# Patient Record
Sex: FEMALE | Race: WHITE | NOT HISPANIC OR LATINO | Employment: UNEMPLOYED | URBAN - METROPOLITAN AREA
[De-identification: names, ages, dates, MRNs, and addresses within clinical notes are randomized per-mention and may not be internally consistent; named-entity substitution may affect disease eponyms.]

---

## 2023-03-24 PROBLEM — G43.909 MIGRAINES: Status: ACTIVE | Noted: 2023-03-24

## 2023-03-24 PROBLEM — K55.1 SUPERIOR MESENTERIC ARTERY SYNDROME (MULTI): Status: ACTIVE | Noted: 2023-03-24

## 2023-03-24 PROBLEM — R20.0 NUMBNESS AND TINGLING: Status: ACTIVE | Noted: 2023-03-24

## 2023-03-24 PROBLEM — N89.8 VAGINAL LESION: Status: ACTIVE | Noted: 2023-03-24

## 2023-03-24 PROBLEM — H61.20 CERUMEN IMPACTION: Status: ACTIVE | Noted: 2023-03-24
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
## 2023-03-24 PROBLEM — R11.2 NAUSEA AND VOMITING IN ADULT: Status: ACTIVE | Noted: 2023-03-24

## 2023-03-24 PROBLEM — M25.50 JOINT PAIN: Status: ACTIVE | Noted: 2023-03-24

## 2023-03-24 PROBLEM — T81.49XA WOUND INFECTION AFTER SURGERY: Status: ACTIVE | Noted: 2023-03-24

## 2023-03-24 PROBLEM — N94.89 ADNEXAL MASS: Status: ACTIVE | Noted: 2023-03-24

## 2023-03-24 PROBLEM — M25.512 LEFT SHOULDER PAIN: Status: ACTIVE | Noted: 2023-03-24

## 2023-03-24 PROBLEM — M79.7 FIBROMYALGIA: Status: ACTIVE | Noted: 2023-03-24

## 2023-03-24 PROBLEM — D72.819 LEUKOPENIA: Status: ACTIVE | Noted: 2023-03-24

## 2023-03-24 PROBLEM — G62.9 NEUROPATHY: Status: ACTIVE | Noted: 2023-03-24

## 2023-03-24 PROBLEM — R93.5 ABNORMAL CT OF THE ABDOMEN: Status: ACTIVE | Noted: 2023-03-24

## 2023-03-24 PROBLEM — N90.89 LABIAL IRRITATION: Status: ACTIVE | Noted: 2023-03-24

## 2023-03-24 PROBLEM — F32.A ANXIETY AND DEPRESSION: Status: ACTIVE | Noted: 2023-03-24

## 2023-03-24 PROBLEM — E53.9 VITAMIN B DEFICIENCY: Status: ACTIVE | Noted: 2023-03-24

## 2023-03-24 PROBLEM — N89.8 VAGINAL DISCHARGE: Status: ACTIVE | Noted: 2023-03-24

## 2023-03-24 PROBLEM — B96.89 BACTERIAL VAGINOSIS: Status: ACTIVE | Noted: 2023-03-24

## 2023-03-24 PROBLEM — R20.2 PARESTHESIA OF ARM: Status: ACTIVE | Noted: 2023-03-24

## 2023-03-24 PROBLEM — K21.9 ACID REFLUX: Status: ACTIVE | Noted: 2023-03-24

## 2023-03-24 PROBLEM — E55.9 VITAMIN D DEFICIENCY: Status: ACTIVE | Noted: 2023-03-24

## 2023-03-24 PROBLEM — R63.4 ABNORMAL WEIGHT LOSS: Status: ACTIVE | Noted: 2023-03-24

## 2023-03-24 PROBLEM — M54.6 THORACIC SPINE PAIN: Status: ACTIVE | Noted: 2023-03-24

## 2023-03-24 PROBLEM — R20.2 NUMBNESS AND TINGLING: Status: ACTIVE | Noted: 2023-03-24

## 2023-03-24 PROBLEM — H61.20 WAX IN EAR: Status: ACTIVE | Noted: 2023-03-24

## 2023-03-24 PROBLEM — G54.0 THORACIC OUTLET SYNDROME OF LEFT THORACIC OUTLET: Status: ACTIVE | Noted: 2023-03-24

## 2023-03-24 PROBLEM — K52.9 ILEITIS: Status: ACTIVE | Noted: 2023-03-24

## 2023-03-24 PROBLEM — J32.9 SINUSITIS: Status: ACTIVE | Noted: 2023-03-24

## 2023-03-24 PROBLEM — E53.8 VITAMIN B 12 DEFICIENCY: Status: ACTIVE | Noted: 2023-03-24

## 2023-03-24 PROBLEM — O92.79 CLOGGED DUCT, POSTPARTUM (HHS-HCC): Status: ACTIVE | Noted: 2023-03-24

## 2023-03-24 PROBLEM — F41.9 ANXIETY AND DEPRESSION: Status: ACTIVE | Noted: 2023-03-24

## 2023-03-24 PROBLEM — T78.40XA ALLERGIC REACTION: Status: ACTIVE | Noted: 2023-03-24

## 2023-03-24 PROBLEM — M53.82 WEAKNESS OF NECK: Status: ACTIVE | Noted: 2023-03-24

## 2023-03-24 PROBLEM — D27.0 DERMOID CYST OF RIGHT OVARY: Status: ACTIVE | Noted: 2023-03-24

## 2023-03-24 PROBLEM — R10.9 ABDOMINAL PAIN: Status: ACTIVE | Noted: 2023-03-24

## 2023-03-24 PROBLEM — E53.1 VITAMIN B6 DEFICIENCY: Status: ACTIVE | Noted: 2023-03-24

## 2023-03-24 PROBLEM — L98.9 PERINEAL IRRITATION IN FEMALE: Status: ACTIVE | Noted: 2023-03-24

## 2023-03-24 PROBLEM — R13.10 DYSPHAGIA: Status: ACTIVE | Noted: 2023-03-24

## 2023-03-24 PROBLEM — N76.0 VULVOVAGINITIS: Status: ACTIVE | Noted: 2023-03-24

## 2023-03-24 PROBLEM — N76.0 BACTERIAL VAGINOSIS: Status: ACTIVE | Noted: 2023-03-24

## 2023-03-24 PROBLEM — K50.00 TERMINAL ILEITIS WITHOUT COMPLICATION (MULTI): Status: ACTIVE | Noted: 2023-03-24

## 2023-03-24 RX ORDER — HYDROXYZINE HYDROCHLORIDE 10 MG/1
1 TABLET, FILM COATED ORAL 3 TIMES DAILY PRN
COMMUNITY

## 2023-03-24 RX ORDER — RIZATRIPTAN BENZOATE 10 MG/1
10 TABLET ORAL AS NEEDED
COMMUNITY
End: 2024-04-03 | Stop reason: SDUPTHER

## 2023-03-24 RX ORDER — FAMOTIDINE 20 MG/1
TABLET, FILM COATED ORAL
COMMUNITY
End: 2023-05-09 | Stop reason: SDUPTHER

## 2023-03-24 RX ORDER — PREGABALIN 25 MG/1
50 CAPSULE ORAL 3 TIMES DAILY
COMMUNITY
End: 2024-04-03 | Stop reason: ALTCHOICE

## 2023-03-24 RX ORDER — SUCRALFATE 1 G/10ML
10 SUSPENSION ORAL 4 TIMES DAILY
COMMUNITY

## 2023-03-24 RX ORDER — CITALOPRAM 20 MG/1
1 TABLET, FILM COATED ORAL DAILY
COMMUNITY
End: 2023-04-11 | Stop reason: SDUPTHER

## 2023-03-24 RX ORDER — METHOCARBAMOL 500 MG/1
TABLET, FILM COATED ORAL
COMMUNITY
End: 2024-04-03 | Stop reason: SDUPTHER

## 2023-03-24 RX ORDER — RABEPRAZOLE SODIUM 20 MG/1
2 TABLET, DELAYED RELEASE ORAL DAILY
COMMUNITY
End: 2023-05-09 | Stop reason: SDUPTHER

## 2023-03-29 ENCOUNTER — APPOINTMENT (OUTPATIENT)
Dept: PRIMARY CARE | Facility: CLINIC | Age: 33
End: 2023-03-29
Payer: COMMERCIAL

## 2023-04-11 ENCOUNTER — TELEPHONE (OUTPATIENT)
Dept: PRIMARY CARE | Facility: CLINIC | Age: 33
End: 2023-04-11
Payer: COMMERCIAL

## 2023-04-11 DIAGNOSIS — F32.A ANXIETY AND DEPRESSION: Primary | ICD-10-CM

## 2023-04-11 DIAGNOSIS — F41.9 ANXIETY AND DEPRESSION: Primary | ICD-10-CM

## 2023-04-11 RX ORDER — CITALOPRAM 20 MG/1
20 TABLET, FILM COATED ORAL DAILY
Qty: 30 TABLET | Refills: 1 | Status: SHIPPED | OUTPATIENT
Start: 2023-04-11 | End: 2023-05-09 | Stop reason: ALTCHOICE

## 2023-04-11 NOTE — TELEPHONE ENCOUNTER
She needs a refill on her Citopram 20 mg takes it 1 time a day and please send to [facility] in [place]

## 2023-04-25 ENCOUNTER — APPOINTMENT (OUTPATIENT)
Dept: PRIMARY CARE | Facility: CLINIC | Age: 33
End: 2023-04-25
Payer: COMMERCIAL

## 2023-05-09 ENCOUNTER — OFFICE VISIT (OUTPATIENT)
Dept: PRIMARY CARE | Facility: CLINIC | Age: 33
End: 2023-05-09
Payer: COMMERCIAL

## 2023-05-09 VITALS
BODY MASS INDEX: 17.4 KG/M2 | OXYGEN SATURATION: 98 % | HEIGHT: 60 IN | WEIGHT: 88.6 LBS | DIASTOLIC BLOOD PRESSURE: 74 MMHG | RESPIRATION RATE: 18 BRPM | HEART RATE: 88 BPM | SYSTOLIC BLOOD PRESSURE: 104 MMHG

## 2023-05-09 DIAGNOSIS — F32.A ANXIETY AND DEPRESSION: Primary | ICD-10-CM

## 2023-05-09 DIAGNOSIS — Z13.220 SCREENING FOR HYPERLIPIDEMIA: ICD-10-CM

## 2023-05-09 DIAGNOSIS — K21.9 GASTROESOPHAGEAL REFLUX DISEASE WITHOUT ESOPHAGITIS: ICD-10-CM

## 2023-05-09 DIAGNOSIS — F41.9 ANXIETY AND DEPRESSION: Primary | ICD-10-CM

## 2023-05-09 DIAGNOSIS — E53.8 VITAMIN B 12 DEFICIENCY: ICD-10-CM

## 2023-05-09 DIAGNOSIS — M79.7 FIBROMYALGIA: ICD-10-CM

## 2023-05-09 DIAGNOSIS — G54.0 THORACIC OUTLET SYNDROME OF LEFT THORACIC OUTLET: ICD-10-CM

## 2023-05-09 DIAGNOSIS — E55.9 VITAMIN D DEFICIENCY: ICD-10-CM

## 2023-05-09 DIAGNOSIS — R73.01 ELEVATED FASTING BLOOD SUGAR: ICD-10-CM

## 2023-05-09 PROBLEM — J32.9 SINUSITIS: Status: RESOLVED | Noted: 2023-03-24 | Resolved: 2023-05-09

## 2023-05-09 PROBLEM — M25.512 LEFT SHOULDER PAIN: Status: RESOLVED | Noted: 2023-03-24 | Resolved: 2023-05-09

## 2023-05-09 PROBLEM — B96.89 BACTERIAL VAGINOSIS: Status: RESOLVED | Noted: 2023-03-24 | Resolved: 2023-05-09

## 2023-05-09 PROBLEM — N89.8 VAGINAL LESION: Status: RESOLVED | Noted: 2023-03-24 | Resolved: 2023-05-09

## 2023-05-09 PROBLEM — N76.0 BACTERIAL VAGINOSIS: Status: RESOLVED | Noted: 2023-03-24 | Resolved: 2023-05-09

## 2023-05-09 PROBLEM — L98.9 PERINEAL IRRITATION IN FEMALE: Status: RESOLVED | Noted: 2023-03-24 | Resolved: 2023-05-09
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
## 2023-05-09 PROBLEM — K55.1 SUPERIOR MESENTERIC ARTERY SYNDROME (MULTI): Status: RESOLVED | Noted: 2023-03-24 | Resolved: 2023-05-09

## 2023-05-09 PROBLEM — N89.8 VAGINAL DISCHARGE: Status: RESOLVED | Noted: 2023-03-24 | Resolved: 2023-05-09

## 2023-05-09 PROBLEM — R13.10 DYSPHAGIA: Status: RESOLVED | Noted: 2023-03-24 | Resolved: 2023-05-09

## 2023-05-09 PROBLEM — R10.9 ABDOMINAL PAIN: Status: RESOLVED | Noted: 2023-03-24 | Resolved: 2023-05-09

## 2023-05-09 PROBLEM — N90.89 LABIAL IRRITATION: Status: RESOLVED | Noted: 2023-03-24 | Resolved: 2023-05-09

## 2023-05-09 PROBLEM — N94.89 ADNEXAL MASS: Status: RESOLVED | Noted: 2023-03-24 | Resolved: 2023-05-09

## 2023-05-09 PROBLEM — T81.49XA WOUND INFECTION AFTER SURGERY: Status: RESOLVED | Noted: 2023-03-24 | Resolved: 2023-05-09

## 2023-05-09 PROBLEM — R63.4 ABNORMAL WEIGHT LOSS: Status: RESOLVED | Noted: 2023-03-24 | Resolved: 2023-05-09

## 2023-05-09 PROBLEM — R20.2 NUMBNESS AND TINGLING: Status: RESOLVED | Noted: 2023-03-24 | Resolved: 2023-05-09

## 2023-05-09 PROBLEM — R11.2 NAUSEA AND VOMITING IN ADULT: Status: RESOLVED | Noted: 2023-03-24 | Resolved: 2023-05-09

## 2023-05-09 PROBLEM — H61.20 CERUMEN IMPACTION: Status: RESOLVED | Noted: 2023-03-24 | Resolved: 2023-05-09

## 2023-05-09 PROBLEM — R20.2 PARESTHESIA OF ARM: Status: RESOLVED | Noted: 2023-03-24 | Resolved: 2023-05-09

## 2023-05-09 PROBLEM — T78.40XA ALLERGIC REACTION: Status: RESOLVED | Noted: 2023-03-24 | Resolved: 2023-05-09

## 2023-05-09 PROBLEM — G62.9 NEUROPATHY: Status: RESOLVED | Noted: 2023-03-24 | Resolved: 2023-05-09

## 2023-05-09 PROBLEM — H61.20 WAX IN EAR: Status: RESOLVED | Noted: 2023-03-24 | Resolved: 2023-05-09

## 2023-05-09 PROBLEM — O92.79 CLOGGED DUCT, POSTPARTUM (HHS-HCC): Status: RESOLVED | Noted: 2023-03-24 | Resolved: 2023-05-09

## 2023-05-09 PROBLEM — M25.50 JOINT PAIN: Status: RESOLVED | Noted: 2023-03-24 | Resolved: 2023-05-09

## 2023-05-09 PROBLEM — E53.9 VITAMIN B DEFICIENCY: Status: RESOLVED | Noted: 2023-03-24 | Resolved: 2023-05-09

## 2023-05-09 PROBLEM — D27.0 DERMOID CYST OF RIGHT OVARY: Status: RESOLVED | Noted: 2023-03-24 | Resolved: 2023-05-09

## 2023-05-09 PROBLEM — K50.00 TERMINAL ILEITIS WITHOUT COMPLICATION (MULTI): Status: RESOLVED | Noted: 2023-03-24 | Resolved: 2023-05-09

## 2023-05-09 PROBLEM — D72.819 LEUKOPENIA: Status: RESOLVED | Noted: 2023-03-24 | Resolved: 2023-05-09

## 2023-05-09 PROBLEM — M54.6 THORACIC SPINE PAIN: Status: RESOLVED | Noted: 2023-03-24 | Resolved: 2023-05-09

## 2023-05-09 PROBLEM — G43.909 MIGRAINES: Status: RESOLVED | Noted: 2023-03-24 | Resolved: 2023-05-09

## 2023-05-09 PROBLEM — K52.9 ILEITIS: Status: RESOLVED | Noted: 2023-03-24 | Resolved: 2023-05-09

## 2023-05-09 PROBLEM — E53.1 VITAMIN B6 DEFICIENCY: Status: RESOLVED | Noted: 2023-03-24 | Resolved: 2023-05-09

## 2023-05-09 PROBLEM — R93.5 ABNORMAL CT OF THE ABDOMEN: Status: RESOLVED | Noted: 2023-03-24 | Resolved: 2023-05-09

## 2023-05-09 PROBLEM — N76.0 VULVOVAGINITIS: Status: RESOLVED | Noted: 2023-03-24 | Resolved: 2023-05-09

## 2023-05-09 PROBLEM — M53.82 WEAKNESS OF NECK: Status: RESOLVED | Noted: 2023-03-24 | Resolved: 2023-05-09

## 2023-05-09 PROBLEM — R20.0 NUMBNESS AND TINGLING: Status: RESOLVED | Noted: 2023-03-24 | Resolved: 2023-05-09

## 2023-05-09 PROCEDURE — 4004F PT TOBACCO SCREEN RCVD TLK: CPT

## 2023-05-09 PROCEDURE — 99214 OFFICE O/P EST MOD 30 MIN: CPT

## 2023-05-09 RX ORDER — CITALOPRAM 40 MG/1
40 TABLET, FILM COATED ORAL DAILY
Qty: 30 TABLET | Refills: 0 | Status: SHIPPED | OUTPATIENT
Start: 2023-05-09 | End: 2023-06-07

## 2023-05-09 RX ORDER — RABEPRAZOLE SODIUM 20 MG/1
40 TABLET, DELAYED RELEASE ORAL DAILY
Qty: 180 TABLET | Refills: 3 | Status: SHIPPED | OUTPATIENT
Start: 2023-05-09 | End: 2024-04-03 | Stop reason: SDUPTHER

## 2023-05-09 RX ORDER — FAMOTIDINE 20 MG/1
40 TABLET, FILM COATED ORAL 2 TIMES DAILY
Qty: 360 TABLET | Refills: 3 | Status: SHIPPED | OUTPATIENT
Start: 2023-05-09 | End: 2024-06-05

## 2023-05-09 ASSESSMENT — ENCOUNTER SYMPTOMS
RESPIRATORY NEGATIVE: 1
HEMATOLOGIC/LYMPHATIC NEGATIVE: 1
CONSTITUTIONAL NEGATIVE: 1
PSYCHIATRIC NEGATIVE: 1
EYES NEGATIVE: 1
GASTROINTESTINAL NEGATIVE: 1
ALLERGIC/IMMUNOLOGIC NEGATIVE: 1
CARDIOVASCULAR NEGATIVE: 1
MUSCULOSKELETAL NEGATIVE: 1
ENDOCRINE NEGATIVE: 1
NEUROLOGICAL NEGATIVE: 1

## 2023-05-09 NOTE — PROGRESS NOTES
"Subjective   Patient ID: Ami Strauss is a 32 y.o. female who presents for Follow-up.    HPI a 32-year-old female with past medical history of anxiety and depression, fibromyalgia, thoracic outlet syndrome of the left thoracic outlet, vitamin deficiency arrives to the clinic with chief complaint of concern for worsening anxiety and depression.  Patient is here to discuss her Celexa 20 mg oral tablet daily and Atarax 10 mg oral tablet 3 times daily as needed.  The last time she was here, was in November of last year where we increased her Celexa 10 mg oral tablet daily to 20 mg oral tablet daily.  She is here to discuss whether or not she should go up on her dosage or try a whole new SSRI.  She denies any worsening suicidal homicidal ideations.  She reports that when she wakes up she just feels like she is just \"going through the motions for the day.\"  She reports that she has a good support system, family, friends, and children.  She just does not know how to \"feel excited for the day.\"  Other than this, the patient denies any chest pain, shortness of breath, diarrhea, fevers, chills, head pain, COVID-like symptoms.    Review of Systems   Constitutional: Negative.    HENT: Negative.     Eyes: Negative.    Respiratory: Negative.     Cardiovascular: Negative.    Gastrointestinal: Negative.    Endocrine: Negative.    Genitourinary: Negative.    Musculoskeletal: Negative.    Skin: Negative.    Allergic/Immunologic: Negative.    Neurological: Negative.    Hematological: Negative.    Psychiatric/Behavioral: Negative.     All other systems reviewed and are negative.      Objective   /74 (BP Location: Right arm, BP Cuff Size: Adult)   Pulse 88   Resp 18   Ht 1.524 m (5')   Wt (!) 40.2 kg (88 lb 9.6 oz)   SpO2 98%   BMI 17.30 kg/m²     Physical Exam  Vitals and nursing note reviewed.   Constitutional:       Appearance: Normal appearance.   HENT:      Head: Normocephalic and atraumatic.      Right Ear: " Tympanic membrane normal.      Left Ear: Tympanic membrane normal.      Nose: Nose normal.      Mouth/Throat:      Mouth: Mucous membranes are moist.      Pharynx: Oropharynx is clear.   Eyes:      Extraocular Movements: Extraocular movements intact.      Conjunctiva/sclera: Conjunctivae normal.      Pupils: Pupils are equal, round, and reactive to light.   Cardiovascular:      Rate and Rhythm: Normal rate and regular rhythm.   Pulmonary:      Effort: Pulmonary effort is normal.      Breath sounds: Normal breath sounds.   Abdominal:      General: Bowel sounds are normal.      Palpations: Abdomen is soft.   Musculoskeletal:         General: Normal range of motion.      Cervical back: Normal range of motion and neck supple.   Skin:     General: Skin is warm.      Capillary Refill: Capillary refill takes less than 2 seconds.   Neurological:      General: No focal deficit present.      Mental Status: She is alert and oriented to person, place, and time. Mental status is at baseline.   Psychiatric:         Mood and Affect: Mood normal.         Behavior: Behavior normal.         Thought Content: Thought content normal.         Judgment: Judgment normal.         Assessment/Plan   Problem List Items Addressed This Visit          Musculoskeletal    Fibromyalgia    Relevant Orders    Follow Up In [facility] - PCP       Endocrine/Metabolic    RESOLVED: Vitamin B 12 deficiency    Relevant Orders    CBC    Vitamin B12    Follow Up In [facility] - PCP    RESOLVED: Vitamin D deficiency    Relevant Orders    Vitamin D, Total    Follow Up In [facility] - PCP       Other    Anxiety and depression - Primary    Relevant Medications    citalopram (CeleXA) 40 mg tablet    Other Relevant Orders    Comprehensive Metabolic Panel    TSH with reflex to Free T4 if abnormal    Follow Up In [facility] - PCP    Thoracic outlet syndrome of left thoracic outlet    Relevant Orders    Follow Up In [facility] - PCP     Other Visit Diagnoses       Elevated fasting blood sugar        Relevant Orders    Hemoglobin A1C    Follow Up In [facility] - PCP    Screening for hyperlipidemia        Relevant Orders    Lipid Panel    Follow Up In [facility] - PCP    Gastroesophageal reflux disease without esophagitis        Relevant Medications    RABEprazole (Aciphex) EC tablet    famotidine (Pepcid) 20 mg tablet    Other Relevant Orders    Follow Up In [facility] - PCP          It was a pleasure seeing you in the office today.    I have ordered lab work for you to complete prior to your next appointment.  These labs require you to fast.  Your next appointment will be in 6 weeks.    We have discussed in great detail of SSRIs and its purpose for anxiety and depression.  Towards the end of the conversation, we have agreed to begin taking Celexa 40 mg oral capsule daily and to continue the Atarax 10 mg oral tablet 3 times daily as needed for outbreaks.  We will discuss medication effectiveness and efficiency in 6 weeks.  You question that this medication is okay for you to take while pregnant, you are not actively pregnant however are trying.  I report that this is okay for you to take during pregnancy.    Please continue all other medications and physical therapy appointments as scheduled.    I also advised you to follow low fat diet and exercise for at least 30 minutes daily.    Anticipatory guidance, age appropriate vaccines, screening exams, health promotion and prevention discussed.    This document was generated using the assistance of voice recognition software. If there are any errors of spelling, grammar, syntax, or meaning; please feel free to contact me directly for clarification.

## 2023-06-06 DIAGNOSIS — F41.9 ANXIETY AND DEPRESSION: ICD-10-CM

## 2023-06-06 DIAGNOSIS — F32.A ANXIETY AND DEPRESSION: ICD-10-CM

## 2023-06-07 RX ORDER — CITALOPRAM 40 MG/1
TABLET, FILM COATED ORAL
Qty: 30 TABLET | Refills: 0 | Status: SHIPPED | OUTPATIENT
Start: 2023-06-07 | End: 2023-07-07 | Stop reason: SDUPTHER

## 2023-06-20 ENCOUNTER — APPOINTMENT (OUTPATIENT)
Dept: PRIMARY CARE | Facility: CLINIC | Age: 33
End: 2023-06-20
Payer: COMMERCIAL

## 2023-07-06 ENCOUNTER — LAB (OUTPATIENT)
Dept: LAB | Facility: LAB | Age: 33
End: 2023-07-06
Payer: COMMERCIAL

## 2023-07-06 DIAGNOSIS — R73.01 ELEVATED FASTING BLOOD SUGAR: ICD-10-CM

## 2023-07-06 DIAGNOSIS — E55.9 VITAMIN D DEFICIENCY: ICD-10-CM

## 2023-07-06 DIAGNOSIS — Z13.220 SCREENING FOR HYPERLIPIDEMIA: ICD-10-CM

## 2023-07-06 DIAGNOSIS — F32.A ANXIETY AND DEPRESSION: ICD-10-CM

## 2023-07-06 DIAGNOSIS — E53.8 VITAMIN B 12 DEFICIENCY: ICD-10-CM

## 2023-07-06 DIAGNOSIS — F41.9 ANXIETY AND DEPRESSION: ICD-10-CM

## 2023-07-06 LAB
ALANINE AMINOTRANSFERASE (SGPT) (U/L) IN SER/PLAS: 17 U/L (ref 7–45)
ALBUMIN (G/DL) IN SER/PLAS: 4.7 G/DL (ref 3.4–5)
ALKALINE PHOSPHATASE (U/L) IN SER/PLAS: 44 U/L (ref 33–110)
ANION GAP IN SER/PLAS: 10 MMOL/L (ref 10–20)
ASPARTATE AMINOTRANSFERASE (SGOT) (U/L) IN SER/PLAS: 21 U/L (ref 9–39)
BILIRUBIN TOTAL (MG/DL) IN SER/PLAS: 0.5 MG/DL (ref 0–1.2)
CALCIDIOL (25 OH VITAMIN D3) (NG/ML) IN SER/PLAS: 22 NG/ML
CALCIUM (MG/DL) IN SER/PLAS: 9.5 MG/DL (ref 8.6–10.3)
CARBON DIOXIDE, TOTAL (MMOL/L) IN SER/PLAS: 28 MMOL/L (ref 21–32)
CHLORIDE (MMOL/L) IN SER/PLAS: 105 MMOL/L (ref 98–107)
CHOLESTEROL (MG/DL) IN SER/PLAS: 164 MG/DL (ref 0–199)
CHOLESTEROL IN HDL (MG/DL) IN SER/PLAS: 80.5 MG/DL
CHOLESTEROL/HDL RATIO: 2
COBALAMIN (VITAMIN B12) (PG/ML) IN SER/PLAS: 645 PG/ML (ref 211–911)
CREATININE (MG/DL) IN SER/PLAS: 0.73 MG/DL (ref 0.5–1.05)
ERYTHROCYTE DISTRIBUTION WIDTH (RATIO) BY AUTOMATED COUNT: 12.6 % (ref 11.5–14.5)
ERYTHROCYTE MEAN CORPUSCULAR HEMOGLOBIN CONCENTRATION (G/DL) BY AUTOMATED: 32.3 G/DL (ref 32–36)
ERYTHROCYTE MEAN CORPUSCULAR VOLUME (FL) BY AUTOMATED COUNT: 94 FL (ref 80–100)
ERYTHROCYTES (10*6/UL) IN BLOOD BY AUTOMATED COUNT: 4.13 X10E12/L (ref 4–5.2)
ESTIMATED AVERAGE GLUCOSE FOR HBA1C: 111 MG/DL
GFR FEMALE: >90 ML/MIN/1.73M2
GLUCOSE (MG/DL) IN SER/PLAS: 88 MG/DL (ref 74–99)
HEMATOCRIT (%) IN BLOOD BY AUTOMATED COUNT: 38.7 % (ref 36–46)
HEMOGLOBIN (G/DL) IN BLOOD: 12.5 G/DL (ref 12–16)
HEMOGLOBIN A1C/HEMOGLOBIN TOTAL IN BLOOD: 5.5 %
LDL: 67 MG/DL (ref 0–99)
LEUKOCYTES (10*3/UL) IN BLOOD BY AUTOMATED COUNT: 6.1 X10E9/L (ref 4.4–11.3)
PLATELETS (10*3/UL) IN BLOOD AUTOMATED COUNT: 237 X10E9/L (ref 150–450)
POTASSIUM (MMOL/L) IN SER/PLAS: 4.4 MMOL/L (ref 3.5–5.3)
PROTEIN TOTAL: 6.9 G/DL (ref 6.4–8.2)
SODIUM (MMOL/L) IN SER/PLAS: 139 MMOL/L (ref 136–145)
THYROTROPIN (MIU/L) IN SER/PLAS BY DETECTION LIMIT <= 0.05 MIU/L: 0.83 MIU/L (ref 0.44–3.98)
TRIGLYCERIDE (MG/DL) IN SER/PLAS: 81 MG/DL (ref 0–149)
UREA NITROGEN (MG/DL) IN SER/PLAS: 14 MG/DL (ref 6–23)
VLDL: 16 MG/DL (ref 0–40)

## 2023-07-06 PROCEDURE — 80061 LIPID PANEL: CPT

## 2023-07-06 PROCEDURE — 80053 COMPREHEN METABOLIC PANEL: CPT

## 2023-07-06 PROCEDURE — 83036 HEMOGLOBIN GLYCOSYLATED A1C: CPT

## 2023-07-06 PROCEDURE — 82306 VITAMIN D 25 HYDROXY: CPT

## 2023-07-06 PROCEDURE — 84443 ASSAY THYROID STIM HORMONE: CPT

## 2023-07-06 PROCEDURE — 82607 VITAMIN B-12: CPT

## 2023-07-06 PROCEDURE — 85027 COMPLETE CBC AUTOMATED: CPT

## 2023-07-06 PROCEDURE — 36415 COLL VENOUS BLD VENIPUNCTURE: CPT

## 2023-07-07 ENCOUNTER — OFFICE VISIT (OUTPATIENT)
Dept: PRIMARY CARE | Facility: CLINIC | Age: 33
End: 2023-07-07
Payer: COMMERCIAL

## 2023-07-07 VITALS
HEART RATE: 97 BPM | HEIGHT: 60 IN | SYSTOLIC BLOOD PRESSURE: 118 MMHG | WEIGHT: 88 LBS | BODY MASS INDEX: 17.28 KG/M2 | OXYGEN SATURATION: 100 % | RESPIRATION RATE: 16 BRPM | DIASTOLIC BLOOD PRESSURE: 68 MMHG

## 2023-07-07 DIAGNOSIS — F32.A ANXIETY AND DEPRESSION: ICD-10-CM

## 2023-07-07 DIAGNOSIS — M79.7 FIBROMYALGIA: ICD-10-CM

## 2023-07-07 DIAGNOSIS — E55.9 VITAMIN D DEFICIENCY: ICD-10-CM

## 2023-07-07 DIAGNOSIS — R53.82 CHRONIC FATIGUE: Primary | ICD-10-CM

## 2023-07-07 DIAGNOSIS — F41.9 ANXIETY AND DEPRESSION: ICD-10-CM

## 2023-07-07 PROCEDURE — 4004F PT TOBACCO SCREEN RCVD TLK: CPT

## 2023-07-07 PROCEDURE — 99213 OFFICE O/P EST LOW 20 MIN: CPT

## 2023-07-07 RX ORDER — CITALOPRAM 40 MG/1
40 TABLET, FILM COATED ORAL DAILY
Qty: 90 TABLET | Refills: 3 | Status: SHIPPED | OUTPATIENT
Start: 2023-07-07 | End: 2024-04-03 | Stop reason: SDUPTHER

## 2023-07-07 RX ORDER — METHYLPHENIDATE HYDROCHLORIDE 18 MG/1
18 TABLET ORAL EVERY MORNING
Qty: 30 TABLET | Refills: 0 | Status: SHIPPED | OUTPATIENT
Start: 2023-07-07 | End: 2023-08-08

## 2023-07-07 ASSESSMENT — ANXIETY QUESTIONNAIRES
6. BECOMING EASILY ANNOYED OR IRRITABLE: NOT AT ALL
IF YOU CHECKED OFF ANY PROBLEMS ON THIS QUESTIONNAIRE, HOW DIFFICULT HAVE THESE PROBLEMS MADE IT FOR YOU TO DO YOUR WORK, TAKE CARE OF THINGS AT HOME, OR GET ALONG WITH OTHER PEOPLE: NOT DIFFICULT AT ALL
4. TROUBLE RELAXING: NOT AT ALL
7. FEELING AFRAID AS IF SOMETHING AWFUL MIGHT HAPPEN: NOT AT ALL
3. WORRYING TOO MUCH ABOUT DIFFERENT THINGS: NOT AT ALL
2. NOT BEING ABLE TO STOP OR CONTROL WORRYING: NOT AT ALL
5. BEING SO RESTLESS THAT IT IS HARD TO SIT STILL: NOT AT ALL
1. FEELING NERVOUS, ANXIOUS, OR ON EDGE: NOT AT ALL
GAD7 TOTAL SCORE: 0

## 2023-07-07 ASSESSMENT — ENCOUNTER SYMPTOMS
HEMATOLOGIC/LYMPHATIC NEGATIVE: 1
CONSTITUTIONAL NEGATIVE: 1
NEUROLOGICAL NEGATIVE: 1
DEPRESSION: 0
ALLERGIC/IMMUNOLOGIC NEGATIVE: 1
ENDOCRINE NEGATIVE: 1
OCCASIONAL FEELINGS OF UNSTEADINESS: 0
EYES NEGATIVE: 1
PSYCHIATRIC NEGATIVE: 1
LOSS OF SENSATION IN FEET: 0
MUSCULOSKELETAL NEGATIVE: 1
CARDIOVASCULAR NEGATIVE: 1
RESPIRATORY NEGATIVE: 1
GASTROINTESTINAL NEGATIVE: 1

## 2023-07-07 ASSESSMENT — PATIENT HEALTH QUESTIONNAIRE - PHQ9
SUM OF ALL RESPONSES TO PHQ9 QUESTIONS 1 AND 2: 0
2. FEELING DOWN, DEPRESSED OR HOPELESS: NOT AT ALL
1. LITTLE INTEREST OR PLEASURE IN DOING THINGS: NOT AT ALL

## 2023-07-07 NOTE — PATIENT INSTRUCTIONS
Prenatal vitamin or Vitamin D3 + Calcium    Concerta 18mg started for chronic fatigue    Follow up in 1 month.

## 2023-07-07 NOTE — PROGRESS NOTES
Subjective   Patient ID: Ami Strauss is a 33 y.o. female who presents for Follow-up.    HPI A 32-year-old female with past medical history of anxiety and depression, fibromyalgia, thoracic outlet syndrome of the left thoracic outlet, vitamin deficiency arrives to the clinic with chief complaint of concern for worsening anxiety and depression. Patient is here to discuss her Celexa 20 mg oral tablet daily and Atarax 10 mg oral tablet 3 times daily as needed. The last time she was here, was in November of last year where we increased her Celexa 10 mg oral tablet daily to 20 mg oral tablet daily. At her last appointment, we increased her Celexa to 40 mg daily.  She report success with this medication.  She also completed blood work and she would like to review them today.  She continues to follow-up [facility] neurology who is prescribing her Lyrica.  She states that this medication is not working and she will call them soon to see if she can wean off of it.  She was on gabapentin in the past.  She does still have reports of chronic fatigue and is wondering there are any solutions to this.    Review of Systems   Constitutional: Negative.    HENT: Negative.     Eyes: Negative.    Respiratory: Negative.     Cardiovascular: Negative.    Gastrointestinal: Negative.    Endocrine: Negative.    Genitourinary: Negative.    Musculoskeletal: Negative.    Skin: Negative.    Allergic/Immunologic: Negative.    Neurological: Negative.    Hematological: Negative.    Psychiatric/Behavioral: Negative.     All other systems reviewed and are negative.      Objective   /68   Pulse 97   Resp 16   Ht 1.524 m (5')   Wt (!) 39.9 kg (88 lb)   SpO2 100%   BMI 17.19 kg/m²     Physical Exam  Vitals and nursing note reviewed.   Constitutional:       Appearance: Normal appearance.   HENT:      Head: Normocephalic and atraumatic.      Right Ear: Tympanic membrane normal.      Left Ear: Tympanic membrane normal.      Nose: Nose  normal.      Mouth/Throat:      Mouth: Mucous membranes are moist.      Pharynx: Oropharynx is clear.   Eyes:      Extraocular Movements: Extraocular movements intact.      Conjunctiva/sclera: Conjunctivae normal.      Pupils: Pupils are equal, round, and reactive to light.   Cardiovascular:      Rate and Rhythm: Normal rate and regular rhythm.   Pulmonary:      Effort: Pulmonary effort is normal.      Breath sounds: Normal breath sounds.   Abdominal:      General: Bowel sounds are normal.      Palpations: Abdomen is soft.   Musculoskeletal:         General: Normal range of motion.      Cervical back: Normal range of motion and neck supple.   Skin:     General: Skin is warm.      Capillary Refill: Capillary refill takes less than 2 seconds.   Neurological:      General: No focal deficit present.      Mental Status: She is alert and oriented to person, place, and time. Mental status is at baseline.   Psychiatric:         Mood and Affect: Mood normal.         Behavior: Behavior normal.         Thought Content: Thought content normal.         Judgment: Judgment normal.         Assessment/Plan   Problem List Items Addressed This Visit       Anxiety and depression    Relevant Medications    citalopram (CeleXA) 40 mg tablet    Other Relevant Orders    Follow Up In [facility] - PCP - Established    Fibromyalgia    Relevant Orders    Follow Up In [facility] - PCP - Established    Vitamin D deficiency    Relevant Orders    Follow Up In [facility] - PCP - Established     Other Visit Diagnoses       Chronic fatigue    -  Primary    Relevant Medications    methylphenidate CR (Concerta) 18 mg daily tablet    Other Relevant Orders    Follow Up In [facility] - PCP - Established          It was a pleasure seeing you in the office today.    For your chronic fatigue, lab work that was completed prior to this encounter shows no signs and symptoms of systemic causation's for fatigue.  We have  discussed multiple avenues in regards to chronic fatigue syndrome, we have agreed to begin taking Concerta 18 mg oral tablet daily.  Follow-up in 1 month for medication effectiveness and efficiency.    Please continue Celexa 40 mg oral tablet daily and Vistaril as needed for your anxiety and depression.    Continue all appointments with physical therapy and all medications set forth by this office and neurology.    The rest of your entire lab panel is unremarkable other than what was stated below:    You are vitamin D deficient.  Please begin taking over-the-counter vitamin D3 and calcium.    Your next appointment will be in 1 month for medication review Concerta 18 mg oral capsule daily for chronic fatigue syndrome.    I also advised you to follow low fat diet and exercise for at least 30 minutes daily.    Anticipatory guidance, age appropriate vaccines, screening exams, health promotion and prevention discussed.    This document was generated using the assistance of voice recognition software. If there are any errors of spelling, grammar, syntax, or meaning; please feel free to contact me directly for clarification.

## 2023-08-08 ENCOUNTER — OFFICE VISIT (OUTPATIENT)
Dept: PRIMARY CARE | Facility: CLINIC | Age: 33
End: 2023-08-08
Payer: COMMERCIAL

## 2023-08-08 VITALS
WEIGHT: 89.2 LBS | SYSTOLIC BLOOD PRESSURE: 118 MMHG | OXYGEN SATURATION: 100 % | DIASTOLIC BLOOD PRESSURE: 68 MMHG | HEIGHT: 60 IN | RESPIRATION RATE: 16 BRPM | HEART RATE: 73 BPM | BODY MASS INDEX: 17.51 KG/M2

## 2023-08-08 DIAGNOSIS — R53.82 CHRONIC FATIGUE: ICD-10-CM

## 2023-08-08 DIAGNOSIS — F32.A ANXIETY AND DEPRESSION: ICD-10-CM

## 2023-08-08 DIAGNOSIS — F41.9 ANXIETY AND DEPRESSION: ICD-10-CM

## 2023-08-08 PROCEDURE — 99213 OFFICE O/P EST LOW 20 MIN: CPT

## 2023-08-08 PROCEDURE — 4004F PT TOBACCO SCREEN RCVD TLK: CPT

## 2023-08-08 ASSESSMENT — PATIENT HEALTH QUESTIONNAIRE - PHQ9
2. FEELING DOWN, DEPRESSED OR HOPELESS: NOT AT ALL
1. LITTLE INTEREST OR PLEASURE IN DOING THINGS: NOT AT ALL
SUM OF ALL RESPONSES TO PHQ9 QUESTIONS 1 AND 2: 0

## 2023-08-08 ASSESSMENT — ANXIETY QUESTIONNAIRES
1. FEELING NERVOUS, ANXIOUS, OR ON EDGE: NOT AT ALL
6. BECOMING EASILY ANNOYED OR IRRITABLE: NOT AT ALL
2. NOT BEING ABLE TO STOP OR CONTROL WORRYING: NOT AT ALL
IF YOU CHECKED OFF ANY PROBLEMS ON THIS QUESTIONNAIRE, HOW DIFFICULT HAVE THESE PROBLEMS MADE IT FOR YOU TO DO YOUR WORK, TAKE CARE OF THINGS AT HOME, OR GET ALONG WITH OTHER PEOPLE: NOT DIFFICULT AT ALL
5. BEING SO RESTLESS THAT IT IS HARD TO SIT STILL: NOT AT ALL
GAD7 TOTAL SCORE: 0
4. TROUBLE RELAXING: NOT AT ALL
3. WORRYING TOO MUCH ABOUT DIFFERENT THINGS: NOT AT ALL
7. FEELING AFRAID AS IF SOMETHING AWFUL MIGHT HAPPEN: NOT AT ALL

## 2023-08-08 ASSESSMENT — ENCOUNTER SYMPTOMS
DEPRESSION: 0
MUSCULOSKELETAL NEGATIVE: 1
RESPIRATORY NEGATIVE: 1
NEUROLOGICAL NEGATIVE: 1
OCCASIONAL FEELINGS OF UNSTEADINESS: 0
EYES NEGATIVE: 1
CARDIOVASCULAR NEGATIVE: 1
ALLERGIC/IMMUNOLOGIC NEGATIVE: 1
PSYCHIATRIC NEGATIVE: 1
GASTROINTESTINAL NEGATIVE: 1
LOSS OF SENSATION IN FEET: 0
HEMATOLOGIC/LYMPHATIC NEGATIVE: 1
CONSTITUTIONAL NEGATIVE: 1
ENDOCRINE NEGATIVE: 1

## 2023-08-08 NOTE — PATIENT INSTRUCTIONS
6 month follow up with Jacqueline Smith CNP    Will call office if decision is made to continue concerta at a higher dose.     No additional orders are needed.

## 2023-08-08 NOTE — PROGRESS NOTES
Subjective   Patient ID: Ami Strauss is a 33 y.o. female who presents for Follow-up.    HPI a 33-year-old female with past medical history of migraines, anxiety and depression, gastrointestinal distress arrives to the clinic with chief complaint of 1 month follow-up.  At her last appointment, she was started on Concerta 18 mg oral tablet daily for her chronic fatigue syndrome.  She reports that she finished the entire medication regimen however noticed no difference.  She still feels fatigued.  She states that she is attempting to try to become pregnant with her  and does not want to try any medications that she may have to get off of.  At this time, she reports that she wants to discontinue the Concerta and continue her daily medication regimen as prescribed and if she changes her mind in the near future, she will asked the office.  Other than this, the patient denies any chest pain, shortness of breath, diarrhea, fevers, chills, head pain, COVID-like symptoms.    Review of Systems   Constitutional: Negative.    HENT: Negative.     Eyes: Negative.    Respiratory: Negative.     Cardiovascular: Negative.    Gastrointestinal: Negative.    Endocrine: Negative.    Genitourinary: Negative.    Musculoskeletal: Negative.    Skin: Negative.    Allergic/Immunologic: Negative.    Neurological: Negative.    Hematological: Negative.    Psychiatric/Behavioral: Negative.     All other systems reviewed and are negative.      Objective   /68   Pulse 73   Resp 16   Ht 1.524 m (5')   Wt (!) 40.5 kg (89 lb 3.2 oz)   SpO2 100%   BMI 17.42 kg/m²     Physical Exam  Vitals and nursing note reviewed.   Constitutional:       Appearance: Normal appearance.   HENT:      Head: Normocephalic and atraumatic.      Right Ear: Tympanic membrane normal.      Left Ear: Tympanic membrane normal.      Nose: Nose normal.      Mouth/Throat:      Mouth: Mucous membranes are moist.      Pharynx: Oropharynx is clear.   Eyes:       Extraocular Movements: Extraocular movements intact.      Conjunctiva/sclera: Conjunctivae normal.      Pupils: Pupils are equal, round, and reactive to light.   Cardiovascular:      Rate and Rhythm: Normal rate and regular rhythm.   Pulmonary:      Effort: Pulmonary effort is normal.      Breath sounds: Normal breath sounds.   Abdominal:      General: Bowel sounds are normal.      Palpations: Abdomen is soft.   Musculoskeletal:         General: Normal range of motion.      Cervical back: Normal range of motion and neck supple.   Skin:     General: Skin is warm.      Capillary Refill: Capillary refill takes less than 2 seconds.   Neurological:      General: No focal deficit present.      Mental Status: She is alert and oriented to person, place, and time. Mental status is at baseline.   Psychiatric:         Mood and Affect: Mood normal.         Behavior: Behavior normal.         Thought Content: Thought content normal.         Judgment: Judgment normal.         Assessment/Plan   Problem List Items Addressed This Visit       Anxiety and depression    Relevant Orders    Follow Up In [facility] - PCP - Established     Other Visit Diagnoses       Chronic fatigue        Relevant Orders    Follow Up In [facility] - PCP - Established        It was a pleasure seeing you in the office today.    Continue the treatment plan set forth by your neurologist and gastroenterologist.    Continue all of your other medications as prescribed.    If you ever decide to restart Concerta at a higher dose, please do not hesitate to call the office.    Your next appointment will be in 6 months with Jacqueline AMIN.    No additional orders are necessary at this time.    Please do not hesitate to call the office for any future questions or concerns.    I also advised you to follow low fat diet and exercise for at least 30 minutes daily.    Anticipatory guidance, age appropriate vaccines, screening exams, health promotion and  prevention discussed.    This document was generated using the assistance of voice recognition software. If there are any errors of spelling, grammar, syntax, or meaning; please feel free to contact me directly for clarification.

## 2023-09-07 ENCOUNTER — TELEPHONE (OUTPATIENT)
Dept: PRIMARY CARE | Facility: CLINIC | Age: 33
End: 2023-09-07
Payer: COMMERCIAL

## 2023-09-07 ENCOUNTER — LAB (OUTPATIENT)
Dept: LAB | Facility: LAB | Age: 33
End: 2023-09-07
Payer: COMMERCIAL

## 2023-09-07 DIAGNOSIS — R35.0 URINE FREQUENCY: ICD-10-CM

## 2023-09-07 LAB
APPEARANCE, URINE: CLEAR
BILIRUBIN, URINE: NEGATIVE
BLOOD, URINE: ABNORMAL
COLOR, URINE: YELLOW
GLUCOSE, URINE: NEGATIVE MG/DL
KETONES, URINE: NEGATIVE MG/DL
LEUKOCYTE ESTERASE, URINE: NEGATIVE
MUCUS, URINE: ABNORMAL /LPF
NITRITE, URINE: NEGATIVE
PH, URINE: 5 (ref 5–8)
PROTEIN, URINE: ABNORMAL MG/DL
RBC, URINE: 30 /HPF (ref 0–5)
SPECIFIC GRAVITY, URINE: 1.02 (ref 1–1.03)
SQUAMOUS EPITHELIAL CELLS, URINE: 2 /HPF
UROBILINOGEN, URINE: <2 MG/DL (ref 0–1.9)
WBC, URINE: 2 /HPF (ref 0–5)

## 2023-09-07 PROCEDURE — 81001 URINALYSIS AUTO W/SCOPE: CPT

## 2023-09-07 PROCEDURE — 87186 SC STD MICRODIL/AGAR DIL: CPT

## 2023-09-07 PROCEDURE — 87077 CULTURE AEROBIC IDENTIFY: CPT

## 2023-09-07 PROCEDURE — 87086 URINE CULTURE/COLONY COUNT: CPT

## 2023-09-07 NOTE — TELEPHONE ENCOUNTER
Patient notified. States she does not think she ever had blood show up on a urine. But did tell me her and her  have been trying to have a baby and have been having a lot of intercourse. Also she said she is about a week away from getting her period.

## 2023-09-07 NOTE — TELEPHONE ENCOUNTER
So far urine is not showing any Leukocytes/ or Nitrates. Will wait for culture results. Does indicate Blood, has she had this in the past?

## 2023-09-07 NOTE — TELEPHONE ENCOUNTER
Possible UTI started 2 days ago-constantly peeing, pressure and painful urinating       [facility]

## 2023-09-08 ENCOUNTER — TELEPHONE (OUTPATIENT)
Dept: PRIMARY CARE | Facility: CLINIC | Age: 33
End: 2023-09-08
Payer: COMMERCIAL

## 2023-09-08 DIAGNOSIS — R35.0 URINE FREQUENCY: Primary | ICD-10-CM

## 2023-09-08 RX ORDER — SULFAMETHOXAZOLE AND TRIMETHOPRIM 800; 160 MG/1; MG/1
1 TABLET ORAL 2 TIMES DAILY
Qty: 6 TABLET | Refills: 0 | Status: SHIPPED | OUTPATIENT
Start: 2023-09-08 | End: 2023-09-11

## 2023-09-08 NOTE — TELEPHONE ENCOUNTER
Patient has urinalysis done 9/7 and did receive some results and waiting on one more. She is having bad back pain and hurts to urinate. She isn't sure if she should wait till she receives final results or should start an antibiotic now. She just wanted to let you know she's still not feeling well.

## 2023-09-08 NOTE — TELEPHONE ENCOUNTER
She called back to see if this could be a kidney stone? She stated that she thought that maybe a few years ago that we had mention a kidney stone. She get  the antibiotic but has not started it yet. Please call her at [phone].

## 2023-09-10 LAB — URINE CULTURE: ABNORMAL

## 2023-09-11 ENCOUNTER — TELEPHONE (OUTPATIENT)
Dept: PRIMARY CARE | Facility: CLINIC | Age: 33
End: 2023-09-11
Payer: COMMERCIAL

## 2023-09-11 NOTE — TELEPHONE ENCOUNTER
Jacqueline Smith, APRN-WILFRIDO Siddiqui, [facility]  Did come back with E. Coli. Susceptible to Bactrim that was already E-Prescribed. Thank you!

## 2023-12-07 ENCOUNTER — APPOINTMENT (OUTPATIENT)
Dept: OBSTETRICS AND GYNECOLOGY | Facility: CLINIC | Age: 33
End: 2023-12-07
Payer: COMMERCIAL

## 2024-01-16 ENCOUNTER — APPOINTMENT (OUTPATIENT)
Dept: OBSTETRICS AND GYNECOLOGY | Facility: CLINIC | Age: 34
End: 2024-01-16
Payer: COMMERCIAL

## 2024-01-30 ENCOUNTER — APPOINTMENT (OUTPATIENT)
Dept: OBSTETRICS AND GYNECOLOGY | Facility: CLINIC | Age: 34
End: 2024-01-30
Payer: COMMERCIAL

## 2024-02-13 ENCOUNTER — APPOINTMENT (OUTPATIENT)
Dept: PRIMARY CARE | Facility: CLINIC | Age: 34
End: 2024-02-13
Payer: COMMERCIAL

## 2024-02-27 ENCOUNTER — APPOINTMENT (OUTPATIENT)
Dept: PRIMARY CARE | Facility: CLINIC | Age: 34
End: 2024-02-27
Payer: COMMERCIAL

## 2024-04-03 ENCOUNTER — OFFICE VISIT (OUTPATIENT)
Dept: PRIMARY CARE | Facility: CLINIC | Age: 34
End: 2024-04-03
Payer: COMMERCIAL

## 2024-04-03 VITALS
DIASTOLIC BLOOD PRESSURE: 67 MMHG | OXYGEN SATURATION: 100 % | SYSTOLIC BLOOD PRESSURE: 97 MMHG | HEART RATE: 75 BPM | WEIGHT: 88.4 LBS | HEIGHT: 60 IN | BODY MASS INDEX: 17.36 KG/M2

## 2024-04-03 DIAGNOSIS — Z00.00 HEALTHCARE MAINTENANCE: ICD-10-CM

## 2024-04-03 DIAGNOSIS — F41.9 ANXIETY AND DEPRESSION: ICD-10-CM

## 2024-04-03 DIAGNOSIS — K21.9 GASTROESOPHAGEAL REFLUX DISEASE WITHOUT ESOPHAGITIS: ICD-10-CM

## 2024-04-03 DIAGNOSIS — Z13.220 LIPID SCREENING: Primary | ICD-10-CM

## 2024-04-03 DIAGNOSIS — R53.82 CHRONIC FATIGUE: ICD-10-CM

## 2024-04-03 DIAGNOSIS — G43.009 MIGRAINE WITHOUT AURA AND WITHOUT STATUS MIGRAINOSUS, NOT INTRACTABLE: ICD-10-CM

## 2024-04-03 DIAGNOSIS — R10.2 PELVIC PAIN: ICD-10-CM

## 2024-04-03 DIAGNOSIS — F32.A ANXIETY AND DEPRESSION: ICD-10-CM

## 2024-04-03 PROCEDURE — 99214 OFFICE O/P EST MOD 30 MIN: CPT | Performed by: FAMILY MEDICINE

## 2024-04-03 RX ORDER — RABEPRAZOLE SODIUM 20 MG/1
40 TABLET, DELAYED RELEASE ORAL DAILY
Qty: 180 TABLET | Refills: 3 | Status: SHIPPED | OUTPATIENT
Start: 2024-04-03 | End: 2025-04-03

## 2024-04-03 RX ORDER — CITALOPRAM 40 MG/1
40 TABLET, FILM COATED ORAL DAILY
Qty: 90 TABLET | Refills: 3 | Status: SHIPPED | OUTPATIENT
Start: 2024-04-03 | End: 2025-04-03

## 2024-04-03 RX ORDER — RIZATRIPTAN BENZOATE 10 MG/1
10 TABLET ORAL AS NEEDED
Qty: 9 TABLET | Refills: 11 | Status: SHIPPED | OUTPATIENT
Start: 2024-04-03

## 2024-04-03 RX ORDER — METHOCARBAMOL 500 MG/1
500 TABLET, FILM COATED ORAL 4 TIMES DAILY
Qty: 120 TABLET | Refills: 11 | Status: SHIPPED | OUTPATIENT
Start: 2024-04-03 | End: 2025-04-03

## 2024-04-03 ASSESSMENT — ENCOUNTER SYMPTOMS
ALLERGIC/IMMUNOLOGIC NEGATIVE: 1
CONSTITUTIONAL NEGATIVE: 1
RESPIRATORY NEGATIVE: 1
MUSCULOSKELETAL NEGATIVE: 1
ENDOCRINE NEGATIVE: 1
CARDIOVASCULAR NEGATIVE: 1
GASTROINTESTINAL NEGATIVE: 1
PSYCHIATRIC NEGATIVE: 1
NEUROLOGICAL NEGATIVE: 1
HEMATOLOGIC/LYMPHATIC NEGATIVE: 1
EYES NEGATIVE: 1

## 2024-04-03 ASSESSMENT — PATIENT HEALTH QUESTIONNAIRE - PHQ9
2. FEELING DOWN, DEPRESSED OR HOPELESS: NOT AT ALL
SUM OF ALL RESPONSES TO PHQ9 QUESTIONS 1 AND 2: 0
1. LITTLE INTEREST OR PLEASURE IN DOING THINGS: NOT AT ALL

## 2024-04-03 NOTE — PROGRESS NOTES
Subjective   Patient ID: mAi Strauss is a 33 y.o. female who presents for New Patient Visit (Pelvic pain).    a 33-year-old female with past medical history of migraines, anxiety and depression, gastrointestinal distress arrives to the clinic with chief complaint new patient establishment and concerns of pelvic pain. Pelvic pain pretty constant now and has progressively gotten worse over the last 6 months, history of right ovarian cyst that needed to be surgically removed. Periods have been irregular, more painful with heavy bleeding. Endorses cramping like pain that is not brought on by anything specific and treats with rest. Does have an appointment with GYN scheduled in two months. Has been following with neurology for migraines of which she is currently treating with robaxin and maxalt as needed which provides migraine relief. Gets 2-3 a week but is not wishing to pursue additional treatment. Would like PCP to take over these medications as she reports neurologist is further for her. Otherwise feels well and has no other concerns.           Review of Systems   Constitutional: Negative.    HENT: Negative.     Eyes: Negative.    Respiratory: Negative.     Cardiovascular: Negative.    Gastrointestinal: Negative.    Endocrine: Negative.    Genitourinary: Negative.    Musculoskeletal: Negative.    Skin: Negative.    Allergic/Immunologic: Negative.    Neurological: Negative.    Hematological: Negative.    Psychiatric/Behavioral: Negative.     All other systems reviewed and are negative.      Objective   BP 97/67   Pulse 75   Ht 1.524 m (5')   Wt (!) 40.1 kg (88 lb 6.4 oz)   SpO2 100%   BMI 17.26 kg/m²     Physical Exam  Constitutional:       Appearance: Normal appearance.   HENT:      Head: Normocephalic and atraumatic.   Cardiovascular:      Rate and Rhythm: Normal rate and regular rhythm.      Heart sounds: No murmur heard.     No gallop.   Pulmonary:      Effort: Pulmonary effort is normal. No respiratory  distress.      Breath sounds: Normal breath sounds.   Abdominal:      General: Bowel sounds are normal. There is no distension.      Tenderness: There is abdominal tenderness in the right lower quadrant and left lower quadrant.   Musculoskeletal:         General: Normal range of motion.   Skin:     General: Skin is warm and dry.      Findings: No lesion or rash.   Neurological:      General: No focal deficit present.      Mental Status: She is alert and oriented to person, place, and time. Mental status is at baseline.   Psychiatric:         Mood and Affect: Mood normal.         Behavior: Behavior normal.         Assessment/Plan   Problem List Items Addressed This Visit       Anxiety and depression    Relevant Medications    citalopram (CeleXA) 40 mg tablet     Other Visit Diagnoses       Lipid screening    -  Primary    Relevant Orders    Lipid Panel    Chronic fatigue        Gastroesophageal reflux disease without esophagitis        Relevant Medications    RABEprazole (Aciphex) EC tablet    Healthcare maintenance        Relevant Orders    TSH with reflex to Free T4 if abnormal    Comprehensive Metabolic Panel    CBC and Auto Differential    Migraine without aura and without status migrainosus, not intractable        Relevant Medications    rizatriptan (Maxalt) 10 mg tablet    methocarbamol (Robaxin) 500 mg tablet    Pelvic pain        Relevant Orders    US pelvis

## 2024-04-04 ENCOUNTER — HOSPITAL ENCOUNTER (OUTPATIENT)
Dept: RADIOLOGY | Facility: HOSPITAL | Age: 34
Discharge: HOME | End: 2024-04-04
Payer: COMMERCIAL

## 2024-04-04 DIAGNOSIS — R10.2 PELVIC PAIN: ICD-10-CM

## 2024-04-04 PROCEDURE — 76856 US EXAM PELVIC COMPLETE: CPT

## 2024-04-04 PROCEDURE — 76856 US EXAM PELVIC COMPLETE: CPT | Performed by: RADIOLOGY

## 2024-04-09 PROBLEM — H66.92 ACUTE LEFT OTITIS MEDIA: Status: RESOLVED | Noted: 2024-04-09 | Resolved: 2024-04-09

## 2024-04-09 PROBLEM — F17.210 CIGARETTE SMOKER: Status: ACTIVE | Noted: 2024-04-09

## 2024-04-09 PROBLEM — H60.509 ACUTE OTITIS EXTERNA: Status: RESOLVED | Noted: 2024-04-09 | Resolved: 2024-04-09

## 2024-04-09 PROBLEM — R73.9 HYPERGLYCEMIA: Status: ACTIVE | Noted: 2023-07-06

## 2024-04-09 PROBLEM — M54.12 CERVICAL RADICULOPATHY: Status: ACTIVE | Noted: 2024-04-09

## 2024-04-09 PROBLEM — R53.83 FATIGUE: Status: ACTIVE | Noted: 2024-04-09

## 2024-04-17 ENCOUNTER — TELEPHONE (OUTPATIENT)
Dept: PRIMARY CARE | Facility: CLINIC | Age: 34
End: 2024-04-17
Payer: COMMERCIAL

## 2024-04-17 DIAGNOSIS — H66.90 ACUTE OTITIS MEDIA, UNSPECIFIED OTITIS MEDIA TYPE: Primary | ICD-10-CM

## 2024-04-17 RX ORDER — AMOXICILLIN 875 MG/1
875 TABLET, FILM COATED ORAL 2 TIMES DAILY
Qty: 20 TABLET | Refills: 0 | Status: SHIPPED | OUTPATIENT
Start: 2024-04-17 | End: 2024-04-27

## 2024-04-17 NOTE — TELEPHONE ENCOUNTER
Possible L ear infection x 3 days--ear feels plugged , pain , no drainage from ear & low grade fever / using heat with no relief    Asking for your recommendation ?  Rx ?    [facility]

## 2024-05-06 ENCOUNTER — TELEPHONE (OUTPATIENT)
Dept: PRIMARY CARE | Facility: CLINIC | Age: 34
End: 2024-05-06
Payer: COMMERCIAL

## 2024-05-06 NOTE — TELEPHONE ENCOUNTER
Patient states that you have been treating her for an ear infection , took her antibiotic and seemed to help her ears but now she is coughing so much she is having SOB, wheezing, coughing up mucus (white, yellowish), says its the worse cough she ever had, runny nose  Says this all started about 2 weeks ago   Asking what to do   [facility]

## 2024-05-07 DIAGNOSIS — J40 BRONCHITIS: Primary | ICD-10-CM

## 2024-05-07 RX ORDER — METHYLPREDNISOLONE 4 MG/1
TABLET ORAL
Qty: 21 TABLET | Refills: 0 | Status: SHIPPED | OUTPATIENT
Start: 2024-05-07 | End: 2024-05-14

## 2024-05-07 RX ORDER — AZITHROMYCIN 250 MG/1
TABLET, FILM COATED ORAL DAILY
Qty: 6 TABLET | Refills: 0 | Status: SHIPPED | OUTPATIENT
Start: 2024-05-07 | End: 2024-05-12

## 2024-05-07 NOTE — TELEPHONE ENCOUNTER
Cristina Marti, APRN-CNP  [id number]  Caller: Unspecified (Yesterday, 11:45 AM)  I have sent in azithromycin an antibiotic and a steroid that should help with any lung infection. If she is not starting to improve by the end of the week, please let me know by Friday morning

## 2024-06-03 ENCOUNTER — APPOINTMENT (OUTPATIENT)
Dept: OBSTETRICS AND GYNECOLOGY | Facility: CLINIC | Age: 34
End: 2024-06-03
Payer: COMMERCIAL

## 2024-06-05 ENCOUNTER — OFFICE VISIT (OUTPATIENT)
Dept: OBSTETRICS AND GYNECOLOGY | Facility: CLINIC | Age: 34
End: 2024-06-05
Payer: COMMERCIAL

## 2024-06-05 VITALS
WEIGHT: 87.4 LBS | DIASTOLIC BLOOD PRESSURE: 60 MMHG | HEIGHT: 60 IN | BODY MASS INDEX: 17.16 KG/M2 | SYSTOLIC BLOOD PRESSURE: 88 MMHG

## 2024-06-05 DIAGNOSIS — Z12.4 SCREENING FOR CERVICAL CANCER: Primary | ICD-10-CM

## 2024-06-05 DIAGNOSIS — N97.0 INFERTILITY ASSOCIATED WITH ANOVULATION: ICD-10-CM

## 2024-06-05 DIAGNOSIS — R63.4 WEIGHT LOSS: ICD-10-CM

## 2024-06-05 DIAGNOSIS — Z01.419 ENCOUNTER FOR ANNUAL ROUTINE GYNECOLOGICAL EXAMINATION: ICD-10-CM

## 2024-06-05 PROCEDURE — 99385 PREV VISIT NEW AGE 18-39: CPT | Performed by: OBSTETRICS & GYNECOLOGY

## 2024-06-05 PROCEDURE — 87624 HPV HI-RISK TYP POOLED RSLT: CPT

## 2024-06-05 ASSESSMENT — PATIENT HEALTH QUESTIONNAIRE - PHQ9
SUM OF ALL RESPONSES TO PHQ9 QUESTIONS 1 & 2: 1
1. LITTLE INTEREST OR PLEASURE IN DOING THINGS: NOT AT ALL
2. FEELING DOWN, DEPRESSED OR HOPELESS: SEVERAL DAYS

## 2024-06-05 ASSESSMENT — ENCOUNTER SYMPTOMS
MYALGIAS: 1
ARTHRALGIAS: 1

## 2024-06-05 NOTE — PROGRESS NOTES
Subjective   Patient ID: Ami Strauss is a 34 y.o. female who presents for No chief complaint on file..  HPI Ami is a 34 years old G1, P1 her child is 5 years old with history of regular cycles LMP 6/2 presents for yearly exam.  She has chief complaint of infertility.  She says for the last 2 years they have been actively trying without any success.  She reports her cycles are  very painful.  The first 2 days of her cycle is very heavy but then becomes lighter.  She denies any significant dyspareunia.  She denies any  family history of endometriosis.    Review of Systems   Musculoskeletal:  Positive for arthralgias and myalgias.   All other systems reviewed and are negative.      Objective   Physical Exam  Vitals reviewed.   Constitutional:       Appearance: Normal appearance.   HENT:      Head: Normocephalic and atraumatic.      Nose: Nose normal.   Cardiovascular:      Rate and Rhythm: Normal rate and regular rhythm.   Pulmonary:      Effort: Pulmonary effort is normal.      Breath sounds: Normal breath sounds.   Chest:      Chest wall: No mass.   Breasts:     Right: Normal.      Left: Normal.   Abdominal:      General: Abdomen is flat. Bowel sounds are normal. There is no distension.      Palpations: Abdomen is soft. There is no mass.   Genitourinary:     General: Normal vulva.      Vagina: Normal.      Cervix: Normal.      Uterus: Normal.       Adnexa: Right adnexa normal and left adnexa normal.      Rectum: Normal.   Musculoskeletal:         General: Normal range of motion.      Cervical back: Normal range of motion.   Skin:     General: Skin is warm and dry.   Neurological:      General: No focal deficit present.      Mental Status: She is alert.   Psychiatric:         Mood and Affect: Mood normal.         Behavior: Behavior normal.         Assessment/Plan   Problem List Items Addressed This Visit    None  Visit Diagnoses         Codes    Screening for cervical cancer    -  Primary Z12.4    Relevant  Orders    THINPREP PAP TEST    Weight loss     R63.4    Relevant Orders    TSH with reflex to Free T4 if abnormal    Progesterone    Infertility associated with anovulation     N97.0    Relevant Orders    TSH with reflex to Free T4 if abnormal    Progesterone    Encounter for annual routine gynecological examination     Z01.419        I have encouraged her to do self breast exam monthly, increase calorie to gain some weight, take prenatal vitamin daily.  I Have also recommended Pregnitude daily.  We discussed possible causes for infertility and I am concerned about endometriosis.  We discussed that she can try gaining some weight and taking P the regnitude and prenatal vits and give 6 months if she is not pregnant she will follow-up with me to do some workup including semen analysis and Possibly a diagnostic laparoscopy.  Will consider doing empirical Clomid treatment.  I also recommended progesterone and TSH screening to be done on day 23 of her cycle.  Follow-up in 6 months.  30 mins with > 50 % discussing possible causes for infertility, possible endometriosis and work up and treatment options available.            Yvette Martinez MD 06/05/24 4:55 PM

## 2024-06-17 ENCOUNTER — TELEPHONE (OUTPATIENT)
Dept: OBSTETRICS AND GYNECOLOGY | Facility: CLINIC | Age: 34
End: 2024-06-17
Payer: COMMERCIAL

## 2024-06-17 DIAGNOSIS — N97.0 ANOVULATION: Primary | ICD-10-CM

## 2024-06-17 RX ORDER — CLOMIPHENE CITRATE 50 MG/1
50 TABLET ORAL DAILY
Qty: 5 TABLET | Refills: 0 | Status: SHIPPED | OUTPATIENT
Start: 2024-06-17 | End: 2024-06-22

## 2024-06-17 NOTE — TELEPHONE ENCOUNTER
Patient saw Dr. Martinez on 6/05/24, Dr. Martinez recommended patient to start prenatal vitamins to increase fertility, she states she is sensitive to some ingredients in vitamins and is more interested in starting on Clomid, she is asking if this is an option and if so what next steps are?

## 2024-06-19 LAB
CYTOLOGY CMNT CVX/VAG CYTO-IMP: NORMAL
HPV HR 12 DNA GENITAL QL NAA+PROBE: NEGATIVE
HPV HR GENOTYPES PNL CVX NAA+PROBE: NEGATIVE
HPV16 DNA SPEC QL NAA+PROBE: NEGATIVE
HPV18 DNA SPEC QL NAA+PROBE: NEGATIVE
LAB AP HPV GENOTYPE QUESTION: YES
LAB AP HPV HR: NORMAL
LABORATORY COMMENT REPORT: NORMAL
LABORATORY COMMENT REPORT: NORMAL
PATH REPORT.TOTAL CANCER: NORMAL

## 2024-06-24 ENCOUNTER — LAB (OUTPATIENT)
Dept: LAB | Facility: LAB | Age: 34
End: 2024-06-24
Payer: COMMERCIAL

## 2024-06-24 DIAGNOSIS — N97.0 INFERTILITY ASSOCIATED WITH ANOVULATION: ICD-10-CM

## 2024-06-24 DIAGNOSIS — Z00.00 HEALTHCARE MAINTENANCE: ICD-10-CM

## 2024-06-24 DIAGNOSIS — Z13.220 LIPID SCREENING: ICD-10-CM

## 2024-06-24 DIAGNOSIS — R63.4 WEIGHT LOSS: ICD-10-CM

## 2024-06-24 LAB
ALBUMIN SERPL BCP-MCNC: 4.5 G/DL (ref 3.4–5)
ALP SERPL-CCNC: 52 U/L (ref 33–110)
ALT SERPL W P-5'-P-CCNC: 10 U/L (ref 7–45)
ANION GAP SERPL CALC-SCNC: 10 MMOL/L (ref 10–20)
AST SERPL W P-5'-P-CCNC: 17 U/L (ref 9–39)
BASOPHILS # BLD AUTO: 0.04 X10*3/UL (ref 0–0.1)
BASOPHILS NFR BLD AUTO: 0.7 %
BILIRUB SERPL-MCNC: 0.7 MG/DL (ref 0–1.2)
BUN SERPL-MCNC: 11 MG/DL (ref 6–23)
CALCIUM SERPL-MCNC: 9.7 MG/DL (ref 8.6–10.3)
CHLORIDE SERPL-SCNC: 103 MMOL/L (ref 98–107)
CHOLEST SERPL-MCNC: 168 MG/DL (ref 0–199)
CHOLESTEROL/HDL RATIO: 2.2
CO2 SERPL-SCNC: 29 MMOL/L (ref 21–32)
CREAT SERPL-MCNC: 0.47 MG/DL (ref 0.5–1.05)
EGFRCR SERPLBLD CKD-EPI 2021: >90 ML/MIN/1.73M*2
EOSINOPHIL # BLD AUTO: 0.08 X10*3/UL (ref 0–0.7)
EOSINOPHIL NFR BLD AUTO: 1.5 %
ERYTHROCYTE [DISTWIDTH] IN BLOOD BY AUTOMATED COUNT: 13.2 % (ref 11.5–14.5)
GLUCOSE SERPL-MCNC: 86 MG/DL (ref 74–99)
HCT VFR BLD AUTO: 46.5 % (ref 36–46)
HDLC SERPL-MCNC: 77.9 MG/DL
HGB BLD-MCNC: 15.3 G/DL (ref 12–16)
IMM GRANULOCYTES # BLD AUTO: 0.01 X10*3/UL (ref 0–0.7)
IMM GRANULOCYTES NFR BLD AUTO: 0.2 % (ref 0–0.9)
LDLC SERPL CALC-MCNC: 69 MG/DL
LYMPHOCYTES # BLD AUTO: 1.48 X10*3/UL (ref 1.2–4.8)
LYMPHOCYTES NFR BLD AUTO: 26.9 %
MCH RBC QN AUTO: 30.4 PG (ref 26–34)
MCHC RBC AUTO-ENTMCNC: 32.9 G/DL (ref 32–36)
MCV RBC AUTO: 92 FL (ref 80–100)
MONOCYTES # BLD AUTO: 0.74 X10*3/UL (ref 0.1–1)
MONOCYTES NFR BLD AUTO: 13.5 %
NEUTROPHILS # BLD AUTO: 3.15 X10*3/UL (ref 1.2–7.7)
NEUTROPHILS NFR BLD AUTO: 57.2 %
NON HDL CHOLESTEROL: 90 MG/DL (ref 0–149)
NRBC BLD-RTO: 0 /100 WBCS (ref 0–0)
PLATELET # BLD AUTO: 263 X10*3/UL (ref 150–450)
POTASSIUM SERPL-SCNC: 4.2 MMOL/L (ref 3.5–5.3)
PROGEST SERPL-MCNC: 3 NG/ML
PROT SERPL-MCNC: 6.8 G/DL (ref 6.4–8.2)
RBC # BLD AUTO: 5.03 X10*6/UL (ref 4–5.2)
SODIUM SERPL-SCNC: 138 MMOL/L (ref 136–145)
TRIGL SERPL-MCNC: 105 MG/DL (ref 0–149)
TSH SERPL-ACNC: 1.5 MIU/L (ref 0.44–3.98)
TSH SERPL-ACNC: 1.53 MIU/L (ref 0.44–3.98)
VLDL: 21 MG/DL (ref 0–40)
WBC # BLD AUTO: 5.5 X10*3/UL (ref 4.4–11.3)

## 2024-06-24 PROCEDURE — 80053 COMPREHEN METABOLIC PANEL: CPT

## 2024-06-24 PROCEDURE — 84443 ASSAY THYROID STIM HORMONE: CPT

## 2024-06-24 PROCEDURE — 80061 LIPID PANEL: CPT

## 2024-06-24 PROCEDURE — 85025 COMPLETE CBC W/AUTO DIFF WBC: CPT

## 2024-06-24 PROCEDURE — 84144 ASSAY OF PROGESTERONE: CPT

## 2024-06-24 PROCEDURE — 36415 COLL VENOUS BLD VENIPUNCTURE: CPT

## 2024-08-06 DIAGNOSIS — N97.0 INFERTILITY ASSOCIATED WITH ANOVULATION: Primary | ICD-10-CM

## 2024-08-06 RX ORDER — CLOMIPHENE CITRATE 50 MG/1
50 TABLET ORAL DAILY
COMMUNITY
Start: 2024-06-25 | End: 2024-08-06 | Stop reason: SDUPTHER

## 2024-08-06 RX ORDER — CLOMIPHENE CITRATE 50 MG/1
50 TABLET ORAL DAILY
Qty: 5 TABLET | Refills: 1 | Status: SHIPPED | OUTPATIENT
Start: 2024-08-06

## 2024-08-06 NOTE — TELEPHONE ENCOUNTER
Patient requests refill of: Clomid  Pharmacy: [facility] [place]  Last seen: 6/5/24  Upcoming appointment: 12/3/24  Allergies: Cipro  Refills pended for Dr. Martinez to review.

## 2024-08-06 NOTE — TELEPHONE ENCOUNTER
Patient calld stating she needs a refill of Clomid sent to the [facility] in [place]. She is a patient of Dr. Martinez.

## 2024-09-05 ENCOUNTER — TELEPHONE (OUTPATIENT)
Dept: OBSTETRICS AND GYNECOLOGY | Facility: CLINIC | Age: 34
End: 2024-09-05
Payer: COMMERCIAL

## 2024-09-05 DIAGNOSIS — N97.0 INFERTILITY ASSOCIATED WITH ANOVULATION: Primary | ICD-10-CM

## 2024-09-05 RX ORDER — LETROZOLE 2.5 MG/1
2.5 TABLET, FILM COATED ORAL DAILY
Qty: 5 TABLET | Refills: 0 | Status: SHIPPED | OUTPATIENT
Start: 2024-09-05 | End: 2024-09-10

## 2024-09-05 NOTE — TELEPHONE ENCOUNTER
Patient called would like to try Letrozole 2.5 mg this cycle.  Dr Martinez ok'd this. I penned a script for Letrozole 2.5 mg for days 5-9

## 2024-10-07 ENCOUNTER — TELEPHONE (OUTPATIENT)
Dept: OBSTETRICS AND GYNECOLOGY | Facility: CLINIC | Age: 34
End: 2024-10-07
Payer: COMMERCIAL

## 2024-10-07 DIAGNOSIS — N97.0 INFERTILITY ASSOCIATED WITH ANOVULATION: Primary | ICD-10-CM

## 2024-10-07 RX ORDER — LETROZOLE 2.5 MG/1
2.5 TABLET, FILM COATED ORAL DAILY
Qty: 5 TABLET | Refills: 0 | Status: SHIPPED | OUTPATIENT
Start: 2024-10-07 | End: 2024-10-12

## 2024-12-03 ENCOUNTER — APPOINTMENT (OUTPATIENT)
Dept: OBSTETRICS AND GYNECOLOGY | Facility: CLINIC | Age: 34
End: 2024-12-03
Payer: COMMERCIAL

## 2024-12-03 VITALS — BODY MASS INDEX: 18.55 KG/M2 | SYSTOLIC BLOOD PRESSURE: 112 MMHG | WEIGHT: 95 LBS | DIASTOLIC BLOOD PRESSURE: 80 MMHG

## 2024-12-03 DIAGNOSIS — Z31.9 INFERTILITY MANAGEMENT: Primary | ICD-10-CM

## 2024-12-03 PROCEDURE — 99213 OFFICE O/P EST LOW 20 MIN: CPT | Performed by: OBSTETRICS & GYNECOLOGY

## 2024-12-03 NOTE — PROGRESS NOTES
Subjective   Patient ID: Ami Strauss is a 34 y.o. female who presents for followup (Followup ).  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  She has tried for the last 2 years on her own without any success.  Last 6 months she tried Clomid and subsequently Terazol.  She reports regular cycles every 28 days and by ovulation kit.  She has been ovulating.  Her original progesterone level prior to ovulation induction medication was 3.  She says that her  has an appointment with urology to have her semen analyzed.  Both she and her  are healthy with no changes in the medical health.  She denies any prior history of pelvic inflammatory disease or STDs.  She does report dysmenorrhea and dyspareunia.  She says that she does have pelvic pain most days but is more severe and on her cycle.  At our last visit I was suspicious about endometriosis and discussed with her possibility of a diagnostic laparoscopy.    Review of Systems   All other systems reviewed and are negative.      Objective   Physical Exam  Constitutional:       Appearance: Normal appearance.   Pulmonary:      Effort: Pulmonary effort is normal.   Neurological:      Mental Status: She is alert.   Psychiatric:         Mood and Affect: Mood normal.         Behavior: Behavior normal.         Assessment/Plan   Problem List Items Addressed This Visit    None  Visit Diagnoses         Codes    Infertility management    -  Primary Z31.9    Relevant Orders    Referral to Reproductive Endocrinology        Today we discussed possible options including to proceed with diagnostic laparoscopy and tubal studies.  We did discuss that if you find endometrial, we can treat mostly lesion with cauterization or removal of any endometriotic cyst.  However sometimes endometriosis microscopic unable to see or in the vital organs such as bladder and ureter and  unable to treat adequately.  Also discussed with her hysterosalpingogram can be done to evaluate the endometrial cavity and patency of the tubes.  We also discussed referral to infertility clinic to have a comprehensive evaluation and treatment.  Patient has decided to proceed with referral.  She is gone and and weight her  evaluation by urology and the results of semen analysis and subsequent follow-up with reproductive endocrinology at [facility].         Yvette Martinez MD 12/03/24 2:28 PM

## 2024-12-15 ASSESSMENT — LIFESTYLE VARIABLES
HISTORY_ALCOHOL_USE: YES
TOBACCO_USE: YES

## 2024-12-16 ENCOUNTER — CONSULT (OUTPATIENT)
Dept: ENDOCRINOLOGY | Facility: CLINIC | Age: 34
End: 2024-12-16
Payer: COMMERCIAL

## 2024-12-16 ENCOUNTER — APPOINTMENT (OUTPATIENT)
Dept: ENDOCRINOLOGY | Facility: CLINIC | Age: 34
End: 2024-12-16
Payer: COMMERCIAL

## 2024-12-16 VITALS
SYSTOLIC BLOOD PRESSURE: 109 MMHG | BODY MASS INDEX: 18.94 KG/M2 | HEIGHT: 60 IN | DIASTOLIC BLOOD PRESSURE: 75 MMHG | WEIGHT: 96.5 LBS | HEART RATE: 99 BPM

## 2024-12-16 DIAGNOSIS — Z13.1 SCREENING FOR DIABETES MELLITUS: ICD-10-CM

## 2024-12-16 DIAGNOSIS — Z01.83 ENCOUNTER FOR RH BLOOD TYPING: ICD-10-CM

## 2024-12-16 DIAGNOSIS — Z31.41 FERTILITY TESTING: ICD-10-CM

## 2024-12-16 DIAGNOSIS — Z01.812 ENCOUNTER FOR PREPROCEDURAL LABORATORY EXAMINATION: ICD-10-CM

## 2024-12-16 DIAGNOSIS — Z13.71 SCREENING FOR GENETIC DISEASE CARRIER STATUS: ICD-10-CM

## 2024-12-16 DIAGNOSIS — N97.9 SECONDARY FEMALE INFERTILITY: Primary | ICD-10-CM

## 2024-12-16 DIAGNOSIS — Z11.3 SCREENING FOR STDS (SEXUALLY TRANSMITTED DISEASES): ICD-10-CM

## 2024-12-16 DIAGNOSIS — N83.209 SIMPLE OVARIAN CYST: ICD-10-CM

## 2024-12-16 DIAGNOSIS — Z11.59 ENCOUNTER FOR SCREENING FOR OTHER VIRAL DISEASES: ICD-10-CM

## 2024-12-16 LAB
ABO GROUP (TYPE) IN BLOOD: NORMAL
ANTIBODY SCREEN: NORMAL
EST. AVERAGE GLUCOSE BLD GHB EST-MCNC: 105 MG/DL
HBA1C MFR BLD: 5.3 %
HBV SURFACE AG SERPL QL IA: NONREACTIVE
HCV AB SER QL: NONREACTIVE
HIV 1+2 AB+HIV1 P24 AG SERPL QL IA: NONREACTIVE
RH FACTOR (ANTIGEN D): NORMAL
RUBV IGG SERPL IA-ACNC: 3.8 IA
RUBV IGG SERPL QL IA: POSITIVE
TREPONEMA PALLIDUM IGG+IGM AB [PRESENCE] IN SERUM OR PLASMA BY IMMUNOASSAY: NONREACTIVE
VARICELLA ZOSTER IGG INDEX: 4.8 IA
VZV IGG SER QL IA: POSITIVE

## 2024-12-16 PROCEDURE — 83036 HEMOGLOBIN GLYCOSYLATED A1C: CPT

## 2024-12-16 PROCEDURE — 87491 CHLMYD TRACH DNA AMP PROBE: CPT

## 2024-12-16 PROCEDURE — 86850 RBC ANTIBODY SCREEN: CPT

## 2024-12-16 PROCEDURE — 86803 HEPATITIS C AB TEST: CPT

## 2024-12-16 PROCEDURE — 86317 IMMUNOASSAY INFECTIOUS AGENT: CPT

## 2024-12-16 PROCEDURE — 86900 BLOOD TYPING SEROLOGIC ABO: CPT

## 2024-12-16 PROCEDURE — 99214 OFFICE O/P EST MOD 30 MIN: CPT

## 2024-12-16 PROCEDURE — 87340 HEPATITIS B SURFACE AG IA: CPT

## 2024-12-16 PROCEDURE — 87591 N.GONORRHOEAE DNA AMP PROB: CPT

## 2024-12-16 PROCEDURE — 86787 VARICELLA-ZOSTER ANTIBODY: CPT

## 2024-12-16 PROCEDURE — 86780 TREPONEMA PALLIDUM: CPT

## 2024-12-16 PROCEDURE — 83516 IMMUNOASSAY NONANTIBODY: CPT

## 2024-12-16 PROCEDURE — 86901 BLOOD TYPING SEROLOGIC RH(D): CPT

## 2024-12-16 PROCEDURE — 87389 HIV-1 AG W/HIV-1&-2 AB AG IA: CPT

## 2024-12-16 ASSESSMENT — PATIENT HEALTH QUESTIONNAIRE - PHQ9
1. LITTLE INTEREST OR PLEASURE IN DOING THINGS: NOT AT ALL
2. FEELING DOWN, DEPRESSED OR HOPELESS: NOT AT ALL
SUM OF ALL RESPONSES TO PHQ9 QUESTIONS 1 AND 2: 0

## 2024-12-16 ASSESSMENT — COLUMBIA-SUICIDE SEVERITY RATING SCALE - C-SSRS
6. HAVE YOU EVER DONE ANYTHING, STARTED TO DO ANYTHING, OR PREPARED TO DO ANYTHING TO END YOUR LIFE?: NO
2. HAVE YOU ACTUALLY HAD ANY THOUGHTS OF KILLING YOURSELF?: NO
1. IN THE PAST MONTH, HAVE YOU WISHED YOU WERE DEAD OR WISHED YOU COULD GO TO SLEEP AND NOT WAKE UP?: NO

## 2024-12-16 ASSESSMENT — PAIN SCALES - GENERAL: PAINLEVEL_OUTOF10: 0-NO PAIN

## 2024-12-16 NOTE — PROGRESS NOTES
Visit Type: In Person    NEW FERTILITY PATIENT VISIT    Referred by: Dr oh    Accompanied today by:   spouse Hermelindo Strauss is a 34 y.o.  female who presents with secondary infertility x 2-3 yrs, s/p 2 cycles each of clomid 50 mg/TIC & Letrozole 2.5 mg/TIC, left ovary ? Simple cyst on ultrasound - no follow up ultrasound performed, ordering provider feels it is suspicious for endometriosis and is suggesting Laparoscopy. H/O  Laparotomy for 4 cm right dermoid cyst 2021, C/O ongoing chronic pelvic pain.    H/O Gastroparesis : EGD with pyloromyotomy with EndoFlip- doing well now    Have you had any concerns about your fertility treatments so far? Yes nothing has worked including clomid and letrozole  Clomid/TIC x 2 cycles  What are you goals for today's visit? Figure out the next steps to find out whats causing the infertility and treatment options     What causes of infertility have been identified on your workup so far? Possible endometriosis     Past Infertility Treatments: Yes  Clomid/TIC & Letrozole /TIC x 6 cycles   Please summarize your fertility treatments to date. Clomid,lettozole two rounds of each       As far as you are aware, do you have insurance coverage for fertility diagnostic testing and/or fertility treatments? Yes      PRIOR EVALUATION / TREATMENT:   Clomid 50 mg/TIC x 2 cycles with + OPKs:  &   Letrozole 2.5 mg/TIC x 2 cycles-  &   LAPAROTOMY WITH RIGHT OVARIAN CYSTECTOMY 2021 for 4 cm dermoid cycst    Hysterosalpingogram: no    Saline Infused Sonography: no    GYN Pelvic Ultrasound: 2024: Cystic structure arising from the left ovary measuring 24 mm in greatest diameter likely a simple physiologic cyst, otherwise unremarkable US.      Prior Labs  Lab Results    Date Done      AMH: No results found for requested labs within last 1825 days. No results found for requested labs within last 1825 days.   TSH: 1.53  (Ref range: 0.44 - 3.98 mIU/L) 6/24/2024   PRL: No results found for requested labs within last 1825 days. No results found for requested labs within last 1825 days.   Testosterone: No results found for requested labs within last 1825 days. No results found for requested labs within last 1825 days.   DHEAS: No results found for requested labs within last 1825 days. No results found for requested labs within last 1825 days.   FSH: No results found for requested labs within last 1825 days. No results found for requested labs within last 1825 days.   17 OHP: No results found for requested labs within last 1825 days. No results found for requested labs within last 1825 days.   HgbA1c: 5.5 (Ref range: %) 7/6/2023   Hepatitis B surface antigen: No results found for requested labs within last 1825 days. No results found for requested labs within last 1825 days.   Hepatitis C antibody: No results found for requested labs within last 1825 days. No results found for requested labs within last 1825 days.   HIV ½ Antigen Antibody screen with reflex: No results found for requested labs within last 1825 days. No results found for requested labs within last 1825 days.   Syphilis screening with reflex: No results found for requested labs within last 1825 days. No results found for requested labs within last 1825 days.   GC: No results found for requested labs within last 1825 days. No results found for requested labs within last 1825 days.   CT: No results found for requested labs within last 1825 days. No results found for requested labs within last 1825 days.   Type and Screen: No results found for requested labs within last 1825 days. No results found for requested labs within last 1825 days.   Rh: No results found for requested labs within last 1825 days. No results found for requested labs within last 1825 days.   Antibody: No results found for requested labs within last 1825 days. No results found for requested labs within  last 1825 days.   Rubella: No results found for requested labs within last 1825 days. No results found for requested labs within last 1825 days.   Varicella: No results found for requested labs within last 1825 days. No results found for requested labs within last 1825 days.   Hemoglobin: No results found for requested labs within last 1825 days. No results found for requested labs within last 1825 days.   Hematocrit: No results found for requested labs within last 1825 days. No results found for requested labs within last 1825 days.   Creatinine: 0.47 (L; Ref range: 0.50 - 1.05 mg/dL) 2024   AST:17 (Ref range: 9 - 39 U/L) 2024   ALT:10 (Ref range: 7 - 45 U/L): 2024     BMI Labs 18.85: done 2024:  CBC: normal  CMP: normal  lipid Panel: normal    Relationship Status:      Have you ever been pregnant? Yes  How many times have you been pregnant? 1  Have you ever had a miscarriage? No  How many times have you had a miscarriage?       OB Hx: :  2019 FT  female 6.9 no complications pregnancy/delivery     OB History          1    Para   1    Term   1            AB        Living   1         SAB        IAB        Ectopic        Multiple        Live Births   1                 GYN HISTORY   Have you ever been diagnosed with a sexually transmitted disease? No  Have you ever had Pelvic Inflammatory Disease? No  Have you had an abnormal PAP smear? Repeat pap test negative- Yes  Date & Result of last PAP smear:   Lab Results   Component Value Date    FINALINTERP  2024         A. THINPREP PAP CERVIX, SCREENING -     Specimen Adequacy  Satisfactory for evaluation; endocervical/transformation zone component is present  Quality Indicator: Partially obscuring blood    General Categorization  Negative for intraepithelial lesion or malignancy.    Descriptive Interpretation  Negative for intraepithelial lesion or malignancy              Have you ever had an abnormal Mammogram?  No  Date & result of your last mammogram:  NA  Do you have pelvic pain? Yes  How many times per week do you have intercourse? 3 times and yes  Do you have pain with intercourse? No  Do you use lubricants with intercourse? None  Do you have pain with bowel movements? No  Do you have pain with a full bladder? No    MENSTRUAL HISTORY  LMP: 12-7-2024  Menarche: 13  Contraception:    Cycle length: 28  Describe your bleeding: Average  Dysmenorrhea: Yes       ENDOCRINE/INFERTILITY HISTORY  Duration of infertility: 2-3 yrs  Coital Activity/week: 3 times and yes  Nipple Discharge: No  Vision changes: No  Headaches: Yes  Excess hair growth: No  Excessive hair loss: No  Acne: No  Oily skin: Yes  Recent weight change  Weight gain: No  Weight loss: No  Exercise more than 3 times a week: No      PMH  Past Medical History:   Diagnosis Date    Acute otitis externa 04/09/2024    Anxiety disorder, unspecified 10/30/2020    Anxiety and depression    Anxiety disorder, unspecified 06/15/2020    Anxiety and depression    Chronic vascular disorders of intestine 04/02/2021    Mesenteric artery syndrome (superior)    Fibromyalgia         MEDICATIONS  Current Outpatient Medications on File Prior to Visit   Medication Sig Dispense Refill    citalopram (CeleXA) 40 mg tablet Take 1 tablet (40 mg) by mouth once daily. 90 tablet 3    Clomid 50 mg tablet Take 1 tablet (50 mg) by mouth once daily. 5 tablet 1    hydrOXYzine HCL (Atarax) 10 mg tablet Take 1 tablet (10 mg) by mouth 3 times a day as needed.      loratadine (CLARITIN ORAL) Take by mouth.      methocarbamol (Robaxin) 500 mg tablet Take 1 tablet (500 mg) by mouth 4 times a day. 120 tablet 11    RABEprazole (Aciphex) EC tablet Take 2 tablets (40 mg) by mouth once daily. 180 tablet 3    rizatriptan (Maxalt) 10 mg tablet Take 1 tablet (10 mg) by mouth if needed for migraine. Take at onset of headache. May repeat after 2 hours. Max 2 tabs/day. 9 tablet 11    sucralfate (Carafate) 100 mg/mL  suspension Take 10 mL (1 g) by mouth 4 times a day.      famotidine (Pepcid) 20 mg tablet Take 2 tablets (40 mg) by mouth 2 times a day. 360 tablet 3     No current facility-administered medications on file prior to visit.        PSH  Past Surgical History:   Procedure Laterality Date    OTHER SURGICAL HISTORY  10/24/2019    Dental surgery    OTHER SURGICAL HISTORY  2021    Ovarian cystectomy    OTHER SURGICAL HISTORY  2021    Laparotomy    OTHER SURGICAL HISTORY  06/15/2020    [place] tooth extraction        PSYCH HISTORY  Have you ever been diagnosed with a mental health Issue? Well controlled on meds- Yes  Have you ever been hospitalized for a mental health disorder? 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 No  Do you have a history of domestic violence? No  Do you feel safe at home? Yes  Do you have a history of any negative sexual experience such as incest or rape? No       PARTNER HISTORY  Partner Name: Hermelindo Strauss  Partner : [id number]  Partner email:    Occupation: Aviation  Prior fertility history: no  PMH:  no  PSH:  hand surgery  Smokin PPD- Yes  Alcohol Use: Yes  Drug Use: No  Medications: None  Injuries: No  STD: ? herpes  SA: No  SA Results:  NA    FAMILY HISTORY  Family History   Problem Relation Name Age of Onset    Other (COPD, mild) Mother      Breast cancer Mother      Other (Diabetes mellitus) Mother      Cancer Mother      Other (COPD, mild) Father      Other (Diabetes mellitus) Father      Cancer Father      Stomach cancer Maternal Grandfather      Throat cancer Maternal  Grandfather      Other (Intestinal cancer) Paternal Grandfather         CANCER HISTORY    Breast: Mother-brrast cancer,aunt-breast cancer  Ovarian:  no  Colon:  no  Endometrial:  no    FAMILY VTE HISTORY  Family History of Blood Clots:  no    GENETIC HISTORY  Ethnic Background  Patient: White  Partner: White  Genetic Disease in Family  Patient: no  Partner: no  Birth Defects in Family  Patient: No  Partner: No    Genetic screening performed previously:  none     BMI:   BMI Readings from Last 1 Encounters:   24 18.85 kg/m²     VITALS:  /75   Pulse 99   Ht 1.524 m (5')   Wt (!) 43.8 kg (96 lb 8 oz)   LMP 2024 (Exact Date)   BMI 18.85 kg/m²     ASSESSMENT   34 y.o.  female with secondary infertility x 2-3 yrs, suspected ovulation and the following pertinent medical issues: Chronic Pelvic Pain- possible Endometriosis, H/O gastroparesis- -:  EGD with pyloromyotomy with EndoFlip- doing well since.    Partner SA: No Assessment    COUNSELING  We discussed causes of infertility including hormonal, egg quality issues, structural problems such as endometriosis, adhesions, or tubal problems, uterine factors such as polyps or fibroids, and sperm issues. Reviewed evaluation of such as well. We discussed various methods for achieving pregnancy in some detail including, ovulation induction, insemination, superovulation and IVF.    We discussed that Letrozole is used for ovulation induction and that recent studies have found letrozole is superior to Clomid for ovulation induction in patients with PCOS. We discussed common side effects of Letrozole including headaches, vision changes, hot flashes, mood changes, and breast tenderness.  Letrozole is NOT FDA approved for the treatment of infertility and was initially associated with (in some studies) a higher risk of congenital anomalies, although this was not found in a more recent large study of patients taking Letrozole for unexplained infertility.  "Patients must take a pregnancy test prior to starting Letrozole each month. We discussed that there is an increased risk of multiple gestation with Letrozole approximately 10% for twins and less than 1% for triplets.  Counseled regarding option of selective reduction for higher order multiples.    We discussed AMH testing and the patient's AMH level, if applicable.  AMH is considered favorable if >1 however this test has many limitations including poor predictive rates of pregnancy. In randomized control trials such as \"Time to conceive\" pts with low AMH levels (<0.7) has similar cumulative pregnancy rates compared with women that had normal AMH levels.  In the \"AMIGOS\" study, AMH did not predict pregnancy rates following OS/IUI for unexplained infertility. However,  AMH is a marker that is helpful to predict how a patient may respond or oocyte yields with FSH and ART treatments, but again there is conflicting data about how this affects pregnancy rates with ART.    INSTRUCTIONS FOR INFERTILITY TESTING    Semen Analysis  The semen samples that you have been asked to submit are important for diagnostic and therapeutic purposes.  Please abstain from ejaculating 2-3 days prior to collecting the sample.  The sample should be produced by masturbation.  You will need to collect the ejaculate into the container supplied by [facility] (you can get containers at your doctor's office).  Do not use other plastic containers from home such as Tupperware as these containers as they may interfere with the test results.  Lubricants and saliva also interfere with test results.  If a collection condom is necessary, please request one at the Andrology lab and they will provide you with an appropriate collection condom.  It is extremely important that the entire sample is collected in the container as the first few drops of ejaculate contain a major portion of sperm.  If you do not collect the entire specimen, please " inform the technologist.      You can collect at home as long as you can get the sample to the [facility]rology lab within 1 hour of collecting.  Please charan your name and time of collection on the container. You should carry the container close to your body.  Collection rooms are available at all locations as well.     An appointment is needed to ensure the lab has enough time to process your specimen.  Please call [phone] to schedule this appointment.     Hysterosalpingogram (HSG or x-ray dye test)  An HSG is a test used to make sure your fallopian tubes are not blocked.  This test can only be done between cycle days 5-11, so it is important for you to call as soon as your period starts to schedule an appointment.  You should take ibuprofen 30 minutes before your appointment as this test can cause transient cramping.      If you are allergic to iodine or shellfish, please inform the . Please call [phone] to schedule this appointment.    PELVIC ULTRASOUNDS  Please call [phone] to schedule this appointment.    Continue to take a prenatal vitamin daily (800mcg or 0.8mg folic acid)    Please reach out to the office for a plan once all the testing is complete- I am not notified when everything on this list is complete, so please call the office directly to check in. The office number is [phone].    If you are interested in integrative medicine to support your care, please contact [facility] at [phone]. Services available include acupuncture, integrative medicine consultations, massage, meditation and stress management. For acupuncture specifically, please ask for Evonne Fallon (east side) or Mary Becker (west side).     We also offer a virtual support group every Monday from 6pm-7pm. For more info please email:    For more information and to RSVP, email [email or web address]     If you have any questions and wish to review with a member of our  team, please do CALL THE OFFICE during business hours. The office number is [phone].     Routine Testing  Fertility Center  STDs Within 1 year   Genetic carrier Waiver/Completed   T&S Within 1 year   AMH Within 1 year   TSH Within 1 year   Rubella/Varicella Within 5 years     PLAN  AMH  T&S  STDs  Rubella/Varicella  [facility]  HgbA1C  Pelvic Ultrasound- call CD 1 to schedule  HSG will defer- will have done with surgery Laparoscopy for endometriosis evaluation and chromopertubation- reach back after surgery for next steps  Consult with Dr. Mc 12- @ 1400 for endometriosis evaluation for pelvic pain- patient will have surgery, possible adenomyosis seen on ultrasound 12-  Chart to primary nurse for care coordination and patient check list/education  Enroll in Engaged MD  Take prenatal vitamins, vitamin D 2000 IUs daily  Discussed that pap and mammogram must be updated per ACOG guidelines before treatment can begin  Discussed that treatment cannot proceed until checklist items are complete   6-8 week virtual follow up with me   Additional testing for BMI < 18 or > 40: NA    MD Completion:  Ectopic Risk: Yes  Medically Complex: No    Partner:   Jumio if partner is a carrier  SA  STDs  Recommend Male Vitamins: Discussed as follows: Co-Q10 200 mg daily, L-Carnitine 200-500 mg daily, Vitamin C 500 mg daily    FOLLOW UP PLAN: Pending possible endometriosis evaluation:  Letrozole 5 mg days 3-7/monitoring/HCG Trigger/TIC vs IUI x 2-3 cycles  May rec IVF pending endometriosis evaluation    Fela Melchor CNP 12/16/24 2:02 PM

## 2024-12-17 LAB
C TRACH RRNA SPEC QL NAA+PROBE: NEGATIVE
N GONORRHOEA DNA SPEC QL PROBE+SIG AMP: NEGATIVE

## 2024-12-18 ENCOUNTER — ANCILLARY PROCEDURE (OUTPATIENT)
Dept: ENDOCRINOLOGY | Facility: CLINIC | Age: 34
End: 2024-12-18
Payer: COMMERCIAL

## 2024-12-18 DIAGNOSIS — N83.209 SIMPLE OVARIAN CYST: ICD-10-CM

## 2024-12-18 PROCEDURE — 76830 TRANSVAGINAL US NON-OB: CPT | Performed by: OBSTETRICS & GYNECOLOGY

## 2024-12-18 PROCEDURE — 76830 TRANSVAGINAL US NON-OB: CPT

## 2024-12-19 ENCOUNTER — TELEMEDICINE (OUTPATIENT)
Dept: ENDOCRINOLOGY | Facility: CLINIC | Age: 34
End: 2024-12-19
Payer: COMMERCIAL

## 2024-12-19 ENCOUNTER — PREP FOR PROCEDURE (OUTPATIENT)
Dept: ENDOCRINOLOGY | Facility: CLINIC | Age: 34
End: 2024-12-19

## 2024-12-19 VITALS — HEIGHT: 60 IN | BODY MASS INDEX: 19.24 KG/M2 | WEIGHT: 98 LBS

## 2024-12-19 DIAGNOSIS — G89.29 CHRONIC PELVIC PAIN IN FEMALE: Primary | ICD-10-CM

## 2024-12-19 DIAGNOSIS — Z01.818 PRE-PROCEDURAL EXAMINATION: ICD-10-CM

## 2024-12-19 DIAGNOSIS — Z01.818 PRE-OP EXAM: Primary | ICD-10-CM

## 2024-12-19 DIAGNOSIS — R10.2 CHRONIC PELVIC PAIN IN FEMALE: Primary | ICD-10-CM

## 2024-12-19 LAB — MIS SERPL-MCNC: 7.13 NG/ML (ref 0.18–11.71)

## 2024-12-19 PROCEDURE — 3008F BODY MASS INDEX DOCD: CPT | Performed by: STUDENT IN AN ORGANIZED HEALTH CARE EDUCATION/TRAINING PROGRAM

## 2024-12-19 PROCEDURE — 99215 OFFICE O/P EST HI 40 MIN: CPT | Performed by: STUDENT IN AN ORGANIZED HEALTH CARE EDUCATION/TRAINING PROGRAM

## 2024-12-19 RX ORDER — GABAPENTIN 600 MG/1
600 TABLET ORAL ONCE
OUTPATIENT
Start: 2024-12-19 | End: 2024-12-19

## 2024-12-19 RX ORDER — ACETAMINOPHEN 325 MG/1
975 TABLET ORAL ONCE
OUTPATIENT
Start: 2024-12-19 | End: 2024-12-19

## 2024-12-19 ASSESSMENT — PAIN SCALES - GENERAL: PAINLEVEL_OUTOF10: 0-NO PAIN

## 2024-12-19 NOTE — PROGRESS NOTES
"  Virtual or Telephone Consent: An interactive audio and video telecommunication system which permits real time communications between the patient (at the originating site) and provider (at the distant site) was utilized to provide this telehealth service    GYN NOTE    2024    Consult from:  Jill    History of present illness: Ami Strauss is a 34 y.o. female   who presents with concerns regarding pelvic pain and c/f endometriosis. Reports pain started after birth of daughter 5 years ago. Describes pain as cramping, bloating and burning across her pelvis; also describes pain down her legs when \"really flared up.\" Also any action that causes tightening of pelvic floor also causes \"flare.\" Reports pain is daily but does get better or worse some days. The worst is during her periods. Notes she had surgery to remove a dermoid cyst via pfannenstiel in  at [place] with Dr. Justine Maldonado- per patient report was not told that any other pathology was noted.     OBSTETRIC HISTORY   2019 FT  female 6.9 no complications pregnancy/delivery     MENSTRUAL HISTORY  LMP: Patient's last menstrual period was 2024 (exact date).  Cycle length- q 28 days, Duration- 4 days, Flow- heavy  Dysmenorrhea: Yes  Medications during menses: uses heating pad and tylenol, not able to use NSAIDs due to gastroparesis     GYN HISTORY:  Dyspareunia/Dyschezia/Dysuria: Yes with intercourse, not always with BM or full bladder  Pelvic pain: Yes  STDs: No  HX of abnormal Pap: Yesremotely, most recently normal   HX of abnormal Mammo:  No    MEDICAL HISTORY  Past Medical History:   Diagnosis Date    Acute otitis externa 2024    Anxiety disorder, unspecified 10/30/2020    Anxiety and depression    Anxiety disorder, unspecified 06/15/2020    Anxiety and depression    Chronic vascular disorders of intestine 2021    Mesenteric artery syndrome (superior)    Fibromyalgia        SURGICAL HISTORY   Past Surgical " History:   Procedure Laterality Date    OTHER SURGICAL HISTORY  10/24/2019    Dental surgery    OTHER SURGICAL HISTORY  07/13/2021    Ovarian cystectomy    OTHER SURGICAL HISTORY  07/13/2021    Laparotomy    OTHER SURGICAL HISTORY  06/15/2020    [place] tooth extraction       SOCIAL HISTORY  Occupation: Stay at home mom  Social History     Tobacco Use    Smoking status: Every Day     Current packs/day: 0.50     Average packs/day: 0.5 packs/day for 13.6 years (6.8 ttl pk-yrs)     Types: Cigarettes     Start date: 5/12/2021    Smokeless tobacco: Never   Vaping Use    Vaping status: Never Used   Substance Use Topics    Alcohol use: Yes    Drug use: Never       FAMILY HISTORY   History of endometriosis: Yes/Uncertain; her grandmother had hysterectomy at age 36 due to pelvic issues  History of fibroids: No    MEDS  Current Outpatient Medications on File Prior to Visit   Medication Sig Dispense Refill    citalopram (CeleXA) 40 mg tablet Take 1 tablet (40 mg) by mouth once daily. 90 tablet 3    famotidine (Pepcid) 20 mg tablet Take 2 tablets (40 mg) by mouth 2 times a day. 360 tablet 3    loratadine (CLARITIN ORAL) Take by mouth.      RABEprazole (Aciphex) EC tablet Take 2 tablets (40 mg) by mouth once daily. 180 tablet 3    rizatriptan (Maxalt) 10 mg tablet Take 1 tablet (10 mg) by mouth if needed for migraine. Take at onset of headache. May repeat after 2 hours. Max 2 tabs/day. 9 tablet 11    [DISCONTINUED] Clomid 50 mg tablet Take 1 tablet (50 mg) by mouth once daily. 5 tablet 1    [DISCONTINUED] hydrOXYzine HCL (Atarax) 10 mg tablet Take 1 tablet (10 mg) by mouth 3 times a day as needed.      [DISCONTINUED] methocarbamol (Robaxin) 500 mg tablet Take 1 tablet (500 mg) by mouth 4 times a day. 120 tablet 11    [DISCONTINUED] sucralfate (Carafate) 100 mg/mL suspension Take 10 mL (1 g) by mouth 4 times a day.       No current facility-administered medications on file prior to visit.       ALLERGIES  Ciprofloxacin, Flagyl  [metronidazole], and Iodinated contrast media    Vitals: LMP 2024 (Exact Date)   Physical Exam  General: NAD  CV: RRR  Lungs: normal respiratory effort    Prior labs:   CBC  Date: 2024  Plt: 263 (Ref range: 150 - 450 x10*3/uL)  Hct: 46.5 (H; Ref range: 36.0 - 46.0 %)    CMP  Date: 2024   BUN: 11 (Ref range: 6 - 23 mg/dL)  Cre: 0.47 (L; Ref range: 0.50 - 1.05 mg/dL)  AST: 17 (Ref range: 9 - 39 U/L)  ALT: 10 (Ref range: 7 - 45 U/L)    Assessment  Ami Strauss is a 34 y.o. female,   with secondary infertility, chronic pelvic pain, and signs of possible adenomyosis on TVUS.     Plan  Diagnostic hysteroscopy  Diagnostic laparoscopy with possible excision of endometriosis  Chromopertubation    Counseling:   The patient's history and relevant imaging were discussed at length, and all questions were answered.  It was explained that endometriosis is a condition where endometrial tissue is found outside of the uterus, commonly in the ovaries, fallopian tubes, bowel, and pelvis. Also discussed adenomyosis. Reviewed that there is no cure for endometriosis, but there are several treatment options. Treatment is individualized and depends on patient goals for fertility and symptom control. Discussed symptoms, diagnosis and treatment options. Risks/benefits/alternatives and details of various treatment options were discussed including observation, medical management, and surgical management. Surgical intervention may or may not alleviate symptoms fully. Reviewed natural history of endometriosis. Described endometriosis as a chronic disease which requires either attempt at pregnancy after treatment or hormonal suppression for the remainder of her reproductive years to prevent additional surgeries and pain recurrences.  No orders of the defined types were placed in this encounter.  Case request placed    Yoly Mc  2024  12:19 PM

## 2024-12-20 PROBLEM — G89.29 CHRONIC PELVIC PAIN IN FEMALE: Status: ACTIVE | Noted: 2024-12-19

## 2024-12-20 PROBLEM — R10.2 CHRONIC PELVIC PAIN IN FEMALE: Status: ACTIVE | Noted: 2024-12-19

## 2025-01-01 ENCOUNTER — DOCUMENTATION (OUTPATIENT)
Dept: ENDOCRINOLOGY | Facility: CLINIC | Age: 35
End: 2025-01-01
Payer: COMMERCIAL

## 2025-01-02 NOTE — PROGRESS NOTES
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 Carriers generally do not experience symptoms. 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 Carriers generally do not experience symptoms.     [   ] Genetic carrier testing reviewed and NEGATIVE result noted.    Additional actions:  [   ] Ok to proceed with next steps, no additional genetic testing or counseling recommended  [  X ] Awaiting partner testing: spouse Hermelindo SALAZAR 1986 will call office to schedule a [facility] blood draw.  [   ] Partner testing reviewed and no concordance. Ok to proceed with planned treatments.     Fela Melchor CNP 25 9:53 PM

## 2025-01-06 LAB — COMMENTS - MP RESULT TYPE: NORMAL

## 2025-01-29 ENCOUNTER — LAB (OUTPATIENT)
Dept: LAB | Facility: HOSPITAL | Age: 35
End: 2025-01-29
Payer: COMMERCIAL

## 2025-01-29 ENCOUNTER — LAB REQUISITION (OUTPATIENT)
Dept: LAB | Facility: HOSPITAL | Age: 35
End: 2025-01-29
Payer: COMMERCIAL

## 2025-01-29 DIAGNOSIS — Z01.818 PRE-PROCEDURAL EXAMINATION: ICD-10-CM

## 2025-01-29 DIAGNOSIS — Z01.818 ENCOUNTER FOR OTHER PREPROCEDURAL EXAMINATION: ICD-10-CM

## 2025-01-29 LAB
ABO GROUP (TYPE) IN BLOOD: NORMAL
ANION GAP SERPL CALC-SCNC: 10 MMOL/L (ref 10–20)
ANTIBODY SCREEN: NORMAL
BUN SERPL-MCNC: 11 MG/DL (ref 6–23)
CALCIUM SERPL-MCNC: 9.7 MG/DL (ref 8.6–10.3)
CHLORIDE SERPL-SCNC: 102 MMOL/L (ref 98–107)
CO2 SERPL-SCNC: 29 MMOL/L (ref 21–32)
CREAT SERPL-MCNC: 0.52 MG/DL (ref 0.5–1.05)
EGFRCR SERPLBLD CKD-EPI 2021: >90 ML/MIN/1.73M*2
ERYTHROCYTE [DISTWIDTH] IN BLOOD BY AUTOMATED COUNT: 12.7 % (ref 11.5–14.5)
GLUCOSE SERPL-MCNC: 86 MG/DL (ref 74–99)
HCT VFR BLD AUTO: 39.2 % (ref 36–46)
HGB BLD-MCNC: 13 G/DL (ref 12–16)
MCH RBC QN AUTO: 31 PG (ref 26–34)
MCHC RBC AUTO-ENTMCNC: 33.2 G/DL (ref 32–36)
MCV RBC AUTO: 94 FL (ref 80–100)
NRBC BLD-RTO: 0 /100 WBCS (ref 0–0)
PLATELET # BLD AUTO: 204 X10*3/UL (ref 150–450)
POTASSIUM SERPL-SCNC: 4.2 MMOL/L (ref 3.5–5.3)
RBC # BLD AUTO: 4.19 X10*6/UL (ref 4–5.2)
RH FACTOR (ANTIGEN D): NORMAL
SODIUM SERPL-SCNC: 137 MMOL/L (ref 136–145)
WBC # BLD AUTO: 5 X10*3/UL (ref 4.4–11.3)

## 2025-01-29 PROCEDURE — 86850 RBC ANTIBODY SCREEN: CPT

## 2025-01-29 PROCEDURE — 86900 BLOOD TYPING SEROLOGIC ABO: CPT

## 2025-01-29 PROCEDURE — 80048 BASIC METABOLIC PNL TOTAL CA: CPT

## 2025-01-29 PROCEDURE — 86901 BLOOD TYPING SEROLOGIC RH(D): CPT | Mod: OUT | Performed by: STUDENT IN AN ORGANIZED HEALTH CARE EDUCATION/TRAINING PROGRAM

## 2025-01-29 PROCEDURE — 86901 BLOOD TYPING SEROLOGIC RH(D): CPT

## 2025-01-29 PROCEDURE — 85027 COMPLETE CBC AUTOMATED: CPT

## 2025-01-30 ENCOUNTER — ANESTHESIA EVENT (OUTPATIENT)
Dept: OPERATING ROOM | Facility: HOSPITAL | Age: 35
End: 2025-01-30
Payer: COMMERCIAL

## 2025-01-30 NOTE — ANESTHESIA PREPROCEDURE EVALUATION
Patient: Ami Strauss    Procedure Information       Date/Time: 01/31/25 0815    Procedures:       Diagnostic Hysteroscopy      Diagnostic Laparoscopy; Bilateral Chromopertubation (Bilateral)      Possible Laparoscopic Excision of Endometriosis    Location: [facility] / [facility]    Surgeons: Yoly Mc MD                                                           Pre-Anesthesia Evaluation      Ami Strauss is a 34 y.o. female who presents for the above mentioned procedure due to Chronic pelvic pain in female [R10.2, G89.29]     Relevant Problems   Neuro   (+) Anxiety and depression   (+) Cervical radiculopathy   (+) Thoracic outlet syndrome of left thoracic outlet      Musculoskeletal   (+) Fibromyalgia      Tobacco   (+) Cigarette smoker      Past Surgical History:   Procedure Laterality Date    OTHER SURGICAL HISTORY  10/24/2019    Dental surgery    OTHER SURGICAL HISTORY  07/13/2021    Ovarian cystectomy    OTHER SURGICAL HISTORY  07/13/2021    Laparotomy    OTHER SURGICAL HISTORY  06/15/2020    [place] tooth extraction     Social History   She reports that she has been smoking cigarettes. She started smoking about 3 years ago. She has a 6.9 pack-year smoking history. She has never used smokeless tobacco. She reports current alcohol use. She reports that she does not use drugs.    Allergies   Allergen Reactions    Ciprofloxacin Unknown    Flagyl [Metronidazole] Unknown    Iodinated Contrast Media Unknown     Agent: Contrast Media Ready-Box MISC     Current Outpatient Medications   Medication Instructions    citalopram (CELEXA) 40 mg, oral, Daily    famotidine (PEPCID) 40 mg, oral, 2 times daily    loratadine (CLARITIN ORAL) Take by mouth.    RABEprazole (ACIPHEX) 40 mg, oral, Daily    rizatriptan (MAXALT) 10 mg, oral, As needed, Take at onset of headache. May repeat after 2 hours. Max 2 tabs/day.     Lab Results   Component Value Date    WBC 5.0 01/29/2025    HGB 13.0 01/29/2025    HCT 39.2  01/29/2025     01/29/2025     Lab Results   Component Value Date/Time    WBC 5.0 01/29/2025 1130    HGB 13.0 01/29/2025 1130    HCT 39.2 01/29/2025 1130     01/29/2025 1130     Lab Results   Component Value Date    GLUCOSE 86 01/29/2025     01/29/2025    K 4.2 01/29/2025     01/29/2025    CO2 29 01/29/2025    ANIONGAP 10 01/29/2025    BUN 11 01/29/2025    CREATININE 0.52 01/29/2025    EGFR >90 01/29/2025    LACTATE 0.6 01/11/2021      Lab Results   Component Value Date/Time    BILITOT 0.7 06/24/2024 1243    PROT 6.8 06/24/2024 1243    ALBUMIN 4.5 06/24/2024 1243    AST 17 06/24/2024 1243    ALT 10 06/24/2024 1243      Lab Results   Component Value Date/Time    PROTIME 12.9 01/11/2021 1827    INR 1.1 01/11/2021 1827    ABO A 01/29/2025 1130     Recent Labs     01/29/25  1130 12/16/24  1410 06/24/24  1312 06/24/24  1243   HGBA1C  --  5.3  --   --    TSH  --   --  1.53 1.50   CALCIUM 9.7  --   --  9.7       Visit Vitals  /72   Pulse 89   Temp 37.2 °C (99 °F) (Temporal)   Resp 15   Ht 1.524 m (5')   Wt (!) 42.4 kg (93 lb 7.6 oz)   SpO2 99%   BMI 18.26 kg/m²   Smoking Status Every Day   BSA 1.34 m²     Lab Results   Component Value Date    PREGTESTUR Negative 01/31/2025                 Clinical information reviewed:   Tobacco  Allergies  Meds   Med Hx  Surg Hx   Fam Hx  Soc Hx        NPO Detail:  NPO/Void Status  Date of Last Liquid: 01/31/25  Time of Last Liquid: 0400  Date of Last Solid: 01/30/25  Time of Last Solid: 1900  Last Intake Type: Clear fluids         Physical Exam    Airway  Mallampati: II  TM distance: >3 FB  Neck ROM: full     Cardiovascular   Rhythm: regular  Rate: normal     Dental - normal exam     Pulmonary   Comments: Normal RR  Non-labored respiration    Abdominal            Anesthesia Plan    History of general anesthesia?: yes  History of complications of general anesthesia?: no    ASA 2     general   (GETA with standard ASA monitoring)  intravenous induction    Postoperative administration of opioids is intended.  Anesthetic plan and risks discussed with patient.    Plan discussed with CAA and CRNA.

## 2025-01-31 ENCOUNTER — ANESTHESIA (OUTPATIENT)
Dept: OPERATING ROOM | Facility: HOSPITAL | Age: 35
End: 2025-01-31
Payer: COMMERCIAL

## 2025-01-31 ENCOUNTER — HOSPITAL ENCOUNTER (OUTPATIENT)
Facility: HOSPITAL | Age: 35
Setting detail: OUTPATIENT SURGERY
Discharge: HOME | End: 2025-01-31
Attending: STUDENT IN AN ORGANIZED HEALTH CARE EDUCATION/TRAINING PROGRAM | Admitting: STUDENT IN AN ORGANIZED HEALTH CARE EDUCATION/TRAINING PROGRAM
Payer: COMMERCIAL

## 2025-01-31 ENCOUNTER — PHARMACY VISIT (OUTPATIENT)
Dept: PHARMACY | Facility: CLINIC | Age: 35
End: 2025-01-31
Payer: COMMERCIAL

## 2025-01-31 VITALS
DIASTOLIC BLOOD PRESSURE: 67 MMHG | WEIGHT: 93.47 LBS | TEMPERATURE: 97 F | OXYGEN SATURATION: 94 % | HEIGHT: 60 IN | RESPIRATION RATE: 13 BRPM | BODY MASS INDEX: 18.35 KG/M2 | HEART RATE: 92 BPM | SYSTOLIC BLOOD PRESSURE: 91 MMHG

## 2025-01-31 DIAGNOSIS — Z98.890 POST-OPERATIVE STATE: ICD-10-CM

## 2025-01-31 DIAGNOSIS — G89.29 CHRONIC PELVIC PAIN IN FEMALE: Primary | ICD-10-CM

## 2025-01-31 DIAGNOSIS — R10.2 CHRONIC PELVIC PAIN IN FEMALE: Primary | ICD-10-CM

## 2025-01-31 LAB — PREGNANCY TEST URINE, POC: NEGATIVE

## 2025-01-31 PROCEDURE — 2500000004 HC RX 250 GENERAL PHARMACY W/ HCPCS (ALT 636 FOR OP/ED): Performed by: NURSE ANESTHETIST, CERTIFIED REGISTERED

## 2025-01-31 PROCEDURE — 2500000001 HC RX 250 WO HCPCS SELF ADMINISTERED DRUGS (ALT 637 FOR MEDICARE OP): Performed by: ANESTHESIOLOGY

## 2025-01-31 PROCEDURE — 2720000007 HC OR 272 NO HCPCS: Performed by: STUDENT IN AN ORGANIZED HEALTH CARE EDUCATION/TRAINING PROGRAM

## 2025-01-31 PROCEDURE — 88342 IMHCHEM/IMCYTCHM 1ST ANTB: CPT | Mod: TC,AHULAB | Performed by: STUDENT IN AN ORGANIZED HEALTH CARE EDUCATION/TRAINING PROGRAM

## 2025-01-31 PROCEDURE — 3700000001 HC GENERAL ANESTHESIA TIME - INITIAL BASE CHARGE: Performed by: STUDENT IN AN ORGANIZED HEALTH CARE EDUCATION/TRAINING PROGRAM

## 2025-01-31 PROCEDURE — 2500000004 HC RX 250 GENERAL PHARMACY W/ HCPCS (ALT 636 FOR OP/ED): Mod: JZ | Performed by: STUDENT IN AN ORGANIZED HEALTH CARE EDUCATION/TRAINING PROGRAM

## 2025-01-31 PROCEDURE — 3700000002 HC GENERAL ANESTHESIA TIME - EACH INCREMENTAL 1 MINUTE: Performed by: STUDENT IN AN ORGANIZED HEALTH CARE EDUCATION/TRAINING PROGRAM

## 2025-01-31 PROCEDURE — 58555 HYSTEROSCOPY DX SEP PROC: CPT | Performed by: STUDENT IN AN ORGANIZED HEALTH CARE EDUCATION/TRAINING PROGRAM

## 2025-01-31 PROCEDURE — 3600000004 HC OR TIME - INITIAL BASE CHARGE - PROCEDURE LEVEL FOUR: Performed by: STUDENT IN AN ORGANIZED HEALTH CARE EDUCATION/TRAINING PROGRAM

## 2025-01-31 PROCEDURE — 3600000009 HC OR TIME - EACH INCREMENTAL 1 MINUTE - PROCEDURE LEVEL FOUR: Performed by: STUDENT IN AN ORGANIZED HEALTH CARE EDUCATION/TRAINING PROGRAM

## 2025-01-31 PROCEDURE — 2500000005 HC RX 250 GENERAL PHARMACY W/O HCPCS: Performed by: STUDENT IN AN ORGANIZED HEALTH CARE EDUCATION/TRAINING PROGRAM

## 2025-01-31 PROCEDURE — 58662 LAPAROSCOPY EXCISE LESIONS: CPT | Performed by: STUDENT IN AN ORGANIZED HEALTH CARE EDUCATION/TRAINING PROGRAM

## 2025-01-31 PROCEDURE — 2500000001 HC RX 250 WO HCPCS SELF ADMINISTERED DRUGS (ALT 637 FOR MEDICARE OP): Performed by: STUDENT IN AN ORGANIZED HEALTH CARE EDUCATION/TRAINING PROGRAM

## 2025-01-31 PROCEDURE — 2500000004 HC RX 250 GENERAL PHARMACY W/ HCPCS (ALT 636 FOR OP/ED): Performed by: STUDENT IN AN ORGANIZED HEALTH CARE EDUCATION/TRAINING PROGRAM

## 2025-01-31 PROCEDURE — 88305 TISSUE EXAM BY PATHOLOGIST: CPT | Performed by: STUDENT IN AN ORGANIZED HEALTH CARE EDUCATION/TRAINING PROGRAM

## 2025-01-31 PROCEDURE — 7100000009 HC PHASE TWO TIME - INITIAL BASE CHARGE: Performed by: STUDENT IN AN ORGANIZED HEALTH CARE EDUCATION/TRAINING PROGRAM

## 2025-01-31 PROCEDURE — RXMED WILLOW AMBULATORY MEDICATION CHARGE

## 2025-01-31 PROCEDURE — 7100000010 HC PHASE TWO TIME - EACH INCREMENTAL 1 MINUTE: Performed by: STUDENT IN AN ORGANIZED HEALTH CARE EDUCATION/TRAINING PROGRAM

## 2025-01-31 PROCEDURE — 7100000002 HC RECOVERY ROOM TIME - EACH INCREMENTAL 1 MINUTE: Performed by: STUDENT IN AN ORGANIZED HEALTH CARE EDUCATION/TRAINING PROGRAM

## 2025-01-31 PROCEDURE — 7100000001 HC RECOVERY ROOM TIME - INITIAL BASE CHARGE: Performed by: STUDENT IN AN ORGANIZED HEALTH CARE EDUCATION/TRAINING PROGRAM

## 2025-01-31 PROCEDURE — 58350 REOPEN FALLOPIAN TUBE: CPT | Performed by: STUDENT IN AN ORGANIZED HEALTH CARE EDUCATION/TRAINING PROGRAM

## 2025-01-31 PROCEDURE — 2500000004 HC RX 250 GENERAL PHARMACY W/ HCPCS (ALT 636 FOR OP/ED): Performed by: ANESTHESIOLOGY

## 2025-01-31 RX ORDER — PROPOFOL 10 MG/ML
INJECTION, EMULSION INTRAVENOUS AS NEEDED
Status: DISCONTINUED | OUTPATIENT
Start: 2025-01-31 | End: 2025-01-31

## 2025-01-31 RX ORDER — HYDRALAZINE HYDROCHLORIDE 20 MG/ML
5 INJECTION INTRAMUSCULAR; INTRAVENOUS EVERY 30 MIN PRN
Status: DISCONTINUED | OUTPATIENT
Start: 2025-01-31 | End: 2025-01-31 | Stop reason: HOSPADM

## 2025-01-31 RX ORDER — FENTANYL CITRATE 50 UG/ML
INJECTION, SOLUTION INTRAMUSCULAR; INTRAVENOUS AS NEEDED
Status: DISCONTINUED | OUTPATIENT
Start: 2025-01-31 | End: 2025-01-31

## 2025-01-31 RX ORDER — ONDANSETRON 4 MG/1
4 TABLET, FILM COATED ORAL EVERY 6 HOURS PRN
Qty: 20 TABLET | Refills: 0 | Status: SHIPPED | OUTPATIENT
Start: 2025-01-31 | End: 2025-02-17 | Stop reason: ALTCHOICE

## 2025-01-31 RX ORDER — MIDAZOLAM HYDROCHLORIDE 1 MG/ML
INJECTION INTRAMUSCULAR; INTRAVENOUS AS NEEDED
Status: DISCONTINUED | OUTPATIENT
Start: 2025-01-31 | End: 2025-01-31

## 2025-01-31 RX ORDER — KETOROLAC TROMETHAMINE 30 MG/ML
30 INJECTION, SOLUTION INTRAMUSCULAR; INTRAVENOUS ONCE
Status: COMPLETED | OUTPATIENT
Start: 2025-01-31 | End: 2025-01-31

## 2025-01-31 RX ORDER — OXYCODONE HYDROCHLORIDE 5 MG/1
5 TABLET ORAL EVERY 6 HOURS PRN
Qty: 10 TABLET | Refills: 0 | Status: SHIPPED | OUTPATIENT
Start: 2025-01-31 | End: 2025-02-17 | Stop reason: ALTCHOICE

## 2025-01-31 RX ORDER — GABAPENTIN 300 MG/1
600 CAPSULE ORAL ONCE
Status: COMPLETED | OUTPATIENT
Start: 2025-01-31 | End: 2025-01-31

## 2025-01-31 RX ORDER — POLYETHYLENE GLYCOL 3350 17 G/17G
17 POWDER, FOR SOLUTION ORAL DAILY PRN
Qty: 10 PACKET | Refills: 0 | Status: SHIPPED | OUTPATIENT
Start: 2025-01-31 | End: 2025-02-17 | Stop reason: ALTCHOICE

## 2025-01-31 RX ORDER — ONDANSETRON HYDROCHLORIDE 2 MG/ML
INJECTION, SOLUTION INTRAVENOUS AS NEEDED
Status: DISCONTINUED | OUTPATIENT
Start: 2025-01-31 | End: 2025-01-31

## 2025-01-31 RX ORDER — ACETAMINOPHEN 325 MG/1
975 TABLET ORAL ONCE
Status: COMPLETED | OUTPATIENT
Start: 2025-01-31 | End: 2025-01-31

## 2025-01-31 RX ORDER — ACETAMINOPHEN 325 MG/1
650 TABLET ORAL EVERY 6 HOURS PRN
Qty: 20 TABLET | Refills: 0 | Status: SHIPPED | OUTPATIENT
Start: 2025-01-31

## 2025-01-31 RX ORDER — ROCURONIUM BROMIDE 10 MG/ML
INJECTION, SOLUTION INTRAVENOUS AS NEEDED
Status: DISCONTINUED | OUTPATIENT
Start: 2025-01-31 | End: 2025-01-31

## 2025-01-31 RX ORDER — ONDANSETRON HYDROCHLORIDE 2 MG/ML
4 INJECTION, SOLUTION INTRAVENOUS ONCE AS NEEDED
Status: COMPLETED | OUTPATIENT
Start: 2025-01-31 | End: 2025-01-31

## 2025-01-31 RX ORDER — OXYCODONE HYDROCHLORIDE 5 MG/1
5 TABLET ORAL
Status: DISCONTINUED | OUTPATIENT
Start: 2025-01-31 | End: 2025-01-31 | Stop reason: HOSPADM

## 2025-01-31 RX ORDER — SODIUM CHLORIDE, SODIUM LACTATE, POTASSIUM CHLORIDE, CALCIUM CHLORIDE 600; 310; 30; 20 MG/100ML; MG/100ML; MG/100ML; MG/100ML
100 INJECTION, SOLUTION INTRAVENOUS CONTINUOUS
Status: ACTIVE | OUTPATIENT
Start: 2025-01-31 | End: 2025-01-31

## 2025-01-31 RX ORDER — BUPIVACAINE HYDROCHLORIDE 2.5 MG/ML
INJECTION, SOLUTION INFILTRATION; PERINEURAL AS NEEDED
Status: DISCONTINUED | OUTPATIENT
Start: 2025-01-31 | End: 2025-01-31 | Stop reason: HOSPADM

## 2025-01-31 RX ORDER — DROPERIDOL 2.5 MG/ML
0.62 INJECTION, SOLUTION INTRAMUSCULAR; INTRAVENOUS ONCE AS NEEDED
Status: COMPLETED | OUTPATIENT
Start: 2025-01-31 | End: 2025-01-31

## 2025-01-31 RX ORDER — PROCHLORPERAZINE EDISYLATE 5 MG/ML
5 INJECTION INTRAMUSCULAR; INTRAVENOUS ONCE AS NEEDED
Status: DISCONTINUED | OUTPATIENT
Start: 2025-01-31 | End: 2025-01-31 | Stop reason: HOSPADM

## 2025-01-31 RX ORDER — LIDOCAINE HYDROCHLORIDE 20 MG/ML
INJECTION, SOLUTION EPIDURAL; INFILTRATION; INTRACAUDAL; PERINEURAL AS NEEDED
Status: DISCONTINUED | OUTPATIENT
Start: 2025-01-31 | End: 2025-01-31

## 2025-01-31 RX ORDER — CEFAZOLIN 1 G/1
INJECTION, POWDER, FOR SOLUTION INTRAVENOUS AS NEEDED
Status: DISCONTINUED | OUTPATIENT
Start: 2025-01-31 | End: 2025-01-31

## 2025-01-31 RX ORDER — IBUPROFEN 600 MG/1
600 TABLET ORAL EVERY 6 HOURS PRN
Qty: 20 TABLET | Refills: 0 | Status: SHIPPED | OUTPATIENT
Start: 2025-01-31 | End: 2025-02-17 | Stop reason: ALTCHOICE

## 2025-01-31 RX ADMIN — MIDAZOLAM HYDROCHLORIDE 2 MG: 1 INJECTION, SOLUTION INTRAMUSCULAR; INTRAVENOUS at 08:21

## 2025-01-31 RX ADMIN — KETOROLAC TROMETHAMINE 30 MG: 30 INJECTION, SOLUTION INTRAMUSCULAR at 10:26

## 2025-01-31 RX ADMIN — HYDROMORPHONE HYDROCHLORIDE 0.5 MG: 1 INJECTION, SOLUTION INTRAMUSCULAR; INTRAVENOUS; SUBCUTANEOUS at 10:09

## 2025-01-31 RX ADMIN — FENTANYL CITRATE 50 MCG: 50 INJECTION, SOLUTION INTRAMUSCULAR; INTRAVENOUS at 09:34

## 2025-01-31 RX ADMIN — PROPOFOL 25 MCG/KG/MIN: 10 INJECTION, EMULSION INTRAVENOUS at 08:37

## 2025-01-31 RX ADMIN — LIDOCAINE HYDROCHLORIDE 50 MG: 20 INJECTION, SOLUTION EPIDURAL; INFILTRATION; INTRACAUDAL; PERINEURAL at 08:29

## 2025-01-31 RX ADMIN — HYDROMORPHONE HYDROCHLORIDE 0.5 MG: 1 INJECTION, SOLUTION INTRAMUSCULAR; INTRAVENOUS; SUBCUTANEOUS at 10:24

## 2025-01-31 RX ADMIN — DROPERIDOL 0.62 MG: 2.5 INJECTION, SOLUTION INTRAMUSCULAR; INTRAVENOUS at 10:23

## 2025-01-31 RX ADMIN — HYDROMORPHONE HYDROCHLORIDE 0.5 MG: 1 INJECTION, SOLUTION INTRAMUSCULAR; INTRAVENOUS; SUBCUTANEOUS at 10:02

## 2025-01-31 RX ADMIN — PROPOFOL 140 MG: 10 INJECTION, EMULSION INTRAVENOUS at 08:29

## 2025-01-31 RX ADMIN — ONDANSETRON 4 MG: 2 INJECTION, SOLUTION INTRAMUSCULAR; INTRAVENOUS at 09:25

## 2025-01-31 RX ADMIN — ROCURONIUM BROMIDE 40 MG: 10 INJECTION, SOLUTION INTRAVENOUS at 08:30

## 2025-01-31 RX ADMIN — SODIUM CHLORIDE: 9 INJECTION, SOLUTION INTRAVENOUS at 08:22

## 2025-01-31 RX ADMIN — OXYCODONE HYDROCHLORIDE 5 MG: 5 TABLET ORAL at 11:42

## 2025-01-31 RX ADMIN — LIDOCAINE HYDROCHLORIDE 50 MG: 20 INJECTION, SOLUTION EPIDURAL; INFILTRATION; INTRACAUDAL; PERINEURAL at 09:36

## 2025-01-31 RX ADMIN — DEXAMETHASONE SODIUM PHOSPHATE 4 MG: 4 INJECTION, SOLUTION INTRAMUSCULAR; INTRAVENOUS at 08:50

## 2025-01-31 RX ADMIN — SUGAMMADEX 100 MG: 100 INJECTION, SOLUTION INTRAVENOUS at 09:29

## 2025-01-31 RX ADMIN — SUGAMMADEX 100 MG: 100 INJECTION, SOLUTION INTRAVENOUS at 09:39

## 2025-01-31 RX ADMIN — ACETAMINOPHEN 975 MG: 325 TABLET, FILM COATED ORAL at 07:36

## 2025-01-31 RX ADMIN — FENTANYL CITRATE 50 MCG: 50 INJECTION, SOLUTION INTRAMUSCULAR; INTRAVENOUS at 08:43

## 2025-01-31 RX ADMIN — ONDANSETRON 4 MG: 2 INJECTION, SOLUTION INTRAMUSCULAR; INTRAVENOUS at 10:02

## 2025-01-31 RX ADMIN — GABAPENTIN 600 MG: 300 CAPSULE ORAL at 07:36

## 2025-01-31 RX ADMIN — PROPOFOL 40 MG: 10 INJECTION, EMULSION INTRAVENOUS at 09:35

## 2025-01-31 RX ADMIN — CEFAZOLIN 2 G: 1 INJECTION, POWDER, FOR SOLUTION INTRAMUSCULAR; INTRAVENOUS at 08:38

## 2025-01-31 RX ADMIN — PROPOFOL 60 MG: 10 INJECTION, EMULSION INTRAVENOUS at 08:32

## 2025-01-31 ASSESSMENT — PAIN SCALES - GENERAL
PAINLEVEL_OUTOF10: 3
PAINLEVEL_OUTOF10: 9
PAINLEVEL_OUTOF10: 4
PAINLEVEL_OUTOF10: 8
PAINLEVEL_OUTOF10: 9
PAINLEVEL_OUTOF10: 8
PAINLEVEL_OUTOF10: 9
PAINLEVEL_OUTOF10: 8
PAINLEVEL_OUTOF10: 4
PAINLEVEL_OUTOF10: 3

## 2025-01-31 ASSESSMENT — PAIN - FUNCTIONAL ASSESSMENT
PAIN_FUNCTIONAL_ASSESSMENT: 0-10
PAIN_FUNCTIONAL_ASSESSMENT: UNABLE TO SELF-REPORT
PAIN_FUNCTIONAL_ASSESSMENT: 0-10

## 2025-01-31 ASSESSMENT — PAIN SCALES - PAIN ASSESSMENT IN ADVANCED DEMENTIA (PAINAD): TOTALSCORE: MEDICATION (SEE MAR)

## 2025-01-31 ASSESSMENT — ENCOUNTER SYMPTOMS
WEAKNESS: 0
CHILLS: 0
CHEST TIGHTNESS: 0
COUGH: 0
ABDOMINAL PAIN: 0
FEVER: 0
DECREASED CONCENTRATION: 0

## 2025-01-31 ASSESSMENT — PAIN DESCRIPTION - DESCRIPTORS: DESCRIPTORS: SORE

## 2025-01-31 ASSESSMENT — COLUMBIA-SUICIDE SEVERITY RATING SCALE - C-SSRS
1. IN THE PAST MONTH, HAVE YOU WISHED YOU WERE DEAD OR WISHED YOU COULD GO TO SLEEP AND NOT WAKE UP?: NO
6. HAVE YOU EVER DONE ANYTHING, STARTED TO DO ANYTHING, OR PREPARED TO DO ANYTHING TO END YOUR LIFE?: NO
2. HAVE YOU ACTUALLY HAD ANY THOUGHTS OF KILLING YOURSELF?: NO

## 2025-01-31 NOTE — H&P
PreOp H+P    History Of Present Illness  Ami Strauss is a 34 y.o. female presenting for scheduled surgery: diagnostic hysteroscopy, diagnostic laparoscopy, excision of endometriosis.     Past Medical History  She has a past medical history of Acute otitis externa (2024), Anxiety disorder, unspecified (10/30/2020), Anxiety disorder, unspecified (06/15/2020), Chronic vascular disorders of intestine (2021), and Fibromyalgia.    Surgical History  She has a past surgical history that includes Other surgical history (10/24/2019); Other surgical history (2021); Other surgical history (2021); and Other surgical history (06/15/2020).     OB History  OB History    Para Term  AB Living   1 1 1     1   SAB IAB Ectopic Multiple Live Births           1      # Outcome Date GA Lbr Harry/2nd Weight Sex Type Anes PTL Lv   1 Term 20    F    NORA       Review of Systems   Constitutional:  Negative for chills and fever.   Respiratory:  Negative for cough and chest tightness.    Cardiovascular:  Negative for chest pain.   Gastrointestinal:  Negative for abdominal pain.   Neurological:  Negative for syncope and weakness.   Psychiatric/Behavioral:  Negative for decreased concentration.         Physical Exam  Vitals reviewed.   Constitutional:       Appearance: Normal appearance.   HENT:      Head: Normocephalic and atraumatic.   Eyes:      Pupils: Pupils are equal, round, and reactive to light.   Cardiovascular:      Rate and Rhythm: Regular rhythm.   Pulmonary:      Effort: Pulmonary effort is normal.   Abdominal:      Palpations: Abdomen is soft.   Skin:     General: Skin is warm.   Neurological:      General: No focal deficit present.      Mental Status: She is alert and oriented to person, place, and time.   Psychiatric:         Mood and Affect: Mood normal.         Behavior: Behavior normal.          Last Recorded Vitals: to be done on admission    Relevant Results  Medications  No current  "facility-administered medications for this encounter.    Labs  CBC  No results found for: \"WBC\", \"NRBC\", \"RBC\", \"HGB\", \"HCT\", \"MCV\", \"MCH\", \"MCHC\", \"RDW\", \"PLT\", \"MPV\"    CMP  No results found for: \"GLUCOSE\", \"NA\", \"K\", \"CL\", \"CO2\", \"ANIONGAP\", \"BUN\", \"CREATININE\", \"EGFR\", \"CALCIUM\", \"ALBUMIN\", \"ALKPHOS\", \"PROT\", \"AST\", \"BILITOT\", \"ALT\"    Coagulation   No results found for: \"PROTIME\", \"INR\", \"APTT\", \"FIBRINOGEN\"    Pregnancy Tests  No results found for: \"HCGQUANT\", \"HCGURINE\"    Imaging   TVUS 12/18/24  Impression  =========     Very small echogenic area in endometrium ~2 mm, otherwise normal appearing uterus and ovaries bilaterally         Assessment/Plan   Assessment & Plan  Chronic pelvic pain in female      Proceed with above surgery           Alexia Velasco MD    "

## 2025-01-31 NOTE — ANESTHESIA PROCEDURE NOTES
Airway  Date/Time: 1/31/2025 8:32 AM  Urgency: elective    Airway not difficult    Staffing  Performed: SRNA   Authorized by: Estelle Hendrix MD    Performed by: JAMILAH Gay-ALBA  Patient location during procedure: OR    Indications and Patient Condition  Indications for airway management: anesthesia  Spontaneous Ventilation: absent  Sedation level: deep  Preoxygenated: yes  Patient position: sniffing      Final Airway Details  Final airway type: endotracheal airway      Successful airway: ETT  Cuffed: yes   Successful intubation technique: direct laryngoscopy  Facilitating devices/methods: intubating stylet  Endotracheal tube insertion site: oral  Blade: Bigg  Blade size: #3  ETT size (mm): 6.5  Cormack-Lehane Classification: grade IIa - partial view of glottis  Placement verified by: capnometry   Measured from: lips  ETT to lips (cm): 22  Number of attempts at approach: 1

## 2025-01-31 NOTE — OP NOTE
Operative Report    Indications: Ami Strauss is a 34 y.o. female with chronic pelvic pain.    Pre-operative Diagnosis: chronic pelvic pain    Post-operative Diagnosis: stage I endometriosis    Procedures:   Diagnostic hysteroscopy  Diagnostic laparoscopy  Bilateral chromopertubation  Excision of endometriosis    Surgeon:   Attending: Yoly Mc MD  Fellow: Alexia Velasco MD     Anesthesia: General    ASA Class: II    Findings:  Hysteroscopy:  Uterine cavity: smooth contour  Bilateral ostia: visualized    Laparoscopy:  The anterior cul-de-sac: normal  Posterior cul-de-sac: normal, powder burn lesions noted near retrocervical septum  Round ligaments: normal  The uterus: boggy, otherwise normal in appearance  The fallopian tubes: normal in appearance   - right fallopian tube with flow on chromopertubation   - left fallopian tube with proximal occlusion of dye, no obvious anatomic defects  The ovaries: normal right ovary; left ovary with small endometritoic lesion  Franklyn masters window in right ovarian fossae  Liver: normal  Appendix: normal    Estimated Blood Loss:   10cc           Urine: 50cc           Total IV Fluids: 600mL           Specimens:   Left ovarian lesion                Complications:  None; patient tolerated the procedure well.           Disposition: PACU - hemodynamically stable.           Condition: stable    Procedure Details   The patient was seen in the Holding Room. The risks, benefits, complications, treatment options, and expected outcomes were discussed with the patient. The possibilities of reaction to medication, pulmonary aspiration, perforation of viscus, bleeding, recurrent infection, the need for additional procedures, failure to diagnose a condition, and creating a complication requiring transfusion or operation were discussed with the patient. The patient concurred with the proposed plan, giving informed consent. The patient was taken to the Operating Room, identified as Ami  DANNY Strauss. A Time Out was held and the above information confirmed.    After induction of general anesthesia, the patient was placed in modified dorsal lithotomy position where she was prepped, draped, and catheterized in the normal, sterile fashion.    A gaston was placed in the bladder and a speculum was placed in the vagina. The cervix was visualized and grasped with a tenaculum. The Aveta hysteroscope was advanced into the uterus. The above findings were noted. The hysteroscope was removed. A dillon manipulator was then inserted into the uterus. The tenaculum and speculum were removed from the vagina. Attention was turned to the abdomen.     A 5mm umbilical incision was made with 11 blade and 5mm Optical trochar was used to enter without difficulty. Once abdominal entry was confirmed, pneumoperitoneum was established. The above findings were noted.     Bilateral chromopertubation was performed with above findings.     Lesion on left ovary was undermined with dilute vasopressin (20cc in 100cc saline) and endoshears were used to remove from ovary. The specimen was passed off to the surgical tech for review. The ovary was hemostatic following.     Following the procedure the umbilical sheath was removed after intra-abdominal carbon dioxide was expressed. The incision was closed with subcutaneous and subcuticular sutures of 4-0 Vicryl. The intrauterine manipulator and gaston were then removed.    Instrument, sponge, and needle counts were correct prior to abdominal closure and at the conclusion of the case.     Dr Mc was present and performed procedure with fellow.     Alexia Velasco MD  Reproductive Endocrinology & Infertility Fellow

## 2025-01-31 NOTE — ANESTHESIA POSTPROCEDURE EVALUATION
Patient: Ami Strauss    Procedure Summary       Date: 01/31/25 Room / Location: [facility] / [facility]    Anesthesia Start: 0818 Anesthesia Stop: 1001    Procedures:       Diagnostic Hysteroscopy      Diagnostic Laparoscopy; Bilateral Chromopertubation (Bilateral)      Possible Laparoscopic Excision of Endometriosis Diagnosis:       Chronic pelvic pain in female      (Chronic pelvic pain in female [R10.2, G89.29])    Surgeons: Yoly Mc MD Responsible Provider: Estelle Hendrix MD    Anesthesia Type: general ASA Status: 2            Anesthesia Type: general    Vitals Value Taken Time   /69 01/31/25 1000   Temp 36.2 °C (97.2 °F) 01/31/25 1000   Pulse 81 01/31/25 1014   Resp 13 01/31/25 1014   SpO2 92 % 01/31/25 1014   Vitals shown include unfiled device data.    Anesthesia Post Evaluation    Patient location during evaluation: PACU  Patient participation: complete - patient participated  Level of consciousness: awake  Pain management: adequate  Multimodal analgesia pain management approach  Airway patency: patent  Cardiovascular status: hemodynamically stable  Respiratory status: spontaneous ventilation  Hydration status: euvolemic  Postoperative Nausea and Vomiting: none        No notable events documented.

## 2025-01-31 NOTE — SIGNIFICANT EVENT
Patient arrived to [facility] after procedure with Anesthesia and procedure RN, procedure discussed, plan reviewed, VSS

## 2025-02-03 ENCOUNTER — DOCUMENTATION (OUTPATIENT)
Dept: ENDOCRINOLOGY | Facility: CLINIC | Age: 35
End: 2025-02-03
Payer: COMMERCIAL

## 2025-02-03 ENCOUNTER — PREP FOR PROCEDURE (OUTPATIENT)
Dept: ENDOCRINOLOGY | Facility: CLINIC | Age: 35
End: 2025-02-03
Payer: COMMERCIAL

## 2025-02-03 DIAGNOSIS — G89.29 CHRONIC PELVIC PAIN IN FEMALE: Primary | ICD-10-CM

## 2025-02-03 DIAGNOSIS — R10.2 CHRONIC PELVIC PAIN IN FEMALE: Primary | ICD-10-CM

## 2025-02-03 RX ORDER — CYCLOBENZAPRINE HCL 5 MG
5 TABLET ORAL 3 TIMES DAILY PRN
Qty: 15 TABLET | Refills: 0 | Status: SHIPPED | OUTPATIENT
Start: 2025-02-03 | End: 2025-02-08

## 2025-02-03 NOTE — PROGRESS NOTES
Called patient to touch base after surgery. Rating pain 9/10 last evening and over night. Could not sleep or move. Mostly in pelvic and down her sides. Typical 7-10/10. Having cramping and mild bleeding. No N/V/F/C. No difficulty with urination. Last BM 1/30/25. Taking simethicone and mirilax. Encouraged colace. If no BM by tomorrow should take a laxative. Taking oxy and Tylenol for pain. Unable to take NSAID due to hx of gastritis. Will message Dr. Mc to see if she is ok sending a few Flexiril in for her. To call if no BM to update us.    Nahomi Mcfadden 02/03/25 2:39 PM

## 2025-02-10 ENCOUNTER — DOCUMENTATION (OUTPATIENT)
Dept: ENDOCRINOLOGY | Facility: CLINIC | Age: 35
End: 2025-02-10
Payer: COMMERCIAL

## 2025-02-10 NOTE — PROGRESS NOTES
Message sent to patient regarding need for authorization to share information for pt and spouse. Fela Melchor CNP 02/10/25 5:05 PM

## 2025-02-14 LAB
LAB AP ASR DISCLAIMER: NORMAL
LABORATORY COMMENT REPORT: NORMAL
PATH REPORT.FINAL DX SPEC: NORMAL
PATH REPORT.GROSS SPEC: NORMAL
PATH REPORT.RELEVANT HX SPEC: NORMAL
PATH REPORT.TOTAL CANCER: NORMAL

## 2025-02-14 PROCEDURE — 88342 IMHCHEM/IMCYTCHM 1ST ANTB: CPT | Performed by: STUDENT IN AN ORGANIZED HEALTH CARE EDUCATION/TRAINING PROGRAM

## 2025-02-16 NOTE — PROGRESS NOTES
Virtual or Telephone Consent: An interactive audio and video telecommunication system which permits real time communications between the patient (at the originating site) and provider (at the distant site) was utilized to provide this telehealth service  N/A    Post Op Visit    Seen Ami Strauss s/p Diagnostic hysteroscopy, Diagnostic laparoscopy, Bilateral chromopertubation, & Excision of endometriosis 1-. Doing well.   3 Abdominal Incisions are healing well: 1 umbilical, 1 LLQ, & 1 RLQ- denies any redness, swelling, or drainage  No issues with bowel or bladder.   No nausea, vomiting, fever or chills  5-6/10 pain medications, had severe pelvic pain prior claire surgery, fluctuates between 3-6  and worse with movement, can only take Tylenol.  No vaginal discharge or abnormal bleeding. Bleeding after surgery and intermittent, can be less or more, changes pad 3-4x/day    Vitals: There were no vitals taken for this visit.  Abdomen: soft, non-tender, 3 abdominal incisions healing per patient    Prior labs:   CBC  Date: 1/29/2025  Plt: 204 (Ref range: 150 - 450 x10*3/uL)  Hct: 39.2 (Ref range: 36.0 - 46.0 %)    CMP  Date: 1/29/2025   BUN: 11 (Ref range: 6 - 23 mg/dL)  Cre: 0.52 (Ref range: 0.50 - 1.05 mg/dL)  AST: 17 (Ref range: 9 - 39 U/L)  ALT: 10 (Ref range: 7 - 45 U/L)    Assessment:   Normal post op, uncomplicated recovery with ongoing chronic pelvic pain    Plan:  Reviewed surgery report:  Findings:  Hysteroscopy:  Uterine cavity: smooth contour  Bilateral ostia: visualized     Laparoscopy:  The anterior cul-de-sac: normal  Posterior cul-de-sac: normal, powder burn lesions noted near retrocervical septum  Round ligaments: normal  The uterus: boggy, otherwise normal in appearance  The fallopian tubes: normal in appearance                - right fallopian tube with flow on chromopertubation                - left fallopian tube with proximal occlusion of dye, no obvious anatomic defects  The ovaries: normal  right ovary; left ovary with small endometritoic lesion  Franklyn velasquez window in right ovarian fossae  Liver: normal  Appendix: normal     Reviewed pathology report:  FINAL DIAGNOSIS   A.  Left ovarian lesion:  --Small fragment of fibroadipose tissue with crush artifact     Note: Immunohistochemical study has been performed.  Pancytokeratin AE1/AE3 shows focal immunoreactivity.       Follow up 3- with Dr. Mc & 3- with DR. Valerio  Stays at home      Intimate Exam Performed: No, an intimate exam was not performed at this encounter.     Fela Melchor CNP 02/17/25 12:11 PM

## 2025-02-17 ENCOUNTER — TELEMEDICINE (OUTPATIENT)
Dept: ENDOCRINOLOGY | Facility: CLINIC | Age: 35
End: 2025-02-17
Payer: COMMERCIAL

## 2025-02-17 VITALS — WEIGHT: 97 LBS | BODY MASS INDEX: 19.04 KG/M2 | HEIGHT: 60 IN

## 2025-02-17 DIAGNOSIS — Z09 POSTOP CHECK: Primary | ICD-10-CM

## 2025-02-17 ASSESSMENT — COLUMBIA-SUICIDE SEVERITY RATING SCALE - C-SSRS
1. IN THE PAST MONTH, HAVE YOU WISHED YOU WERE DEAD OR WISHED YOU COULD GO TO SLEEP AND NOT WAKE UP?: NO
2. HAVE YOU ACTUALLY HAD ANY THOUGHTS OF KILLING YOURSELF?: NO
6. HAVE YOU EVER DONE ANYTHING, STARTED TO DO ANYTHING, OR PREPARED TO DO ANYTHING TO END YOUR LIFE?: NO

## 2025-02-17 ASSESSMENT — PAIN SCALES - GENERAL: PAINLEVEL_OUTOF10: 8

## 2025-03-04 ENCOUNTER — APPOINTMENT (OUTPATIENT)
Dept: ENDOCRINOLOGY | Facility: CLINIC | Age: 35
End: 2025-03-04
Payer: COMMERCIAL

## 2025-03-10 ENCOUNTER — TELEMEDICINE (OUTPATIENT)
Dept: ENDOCRINOLOGY | Facility: CLINIC | Age: 35
End: 2025-03-10
Payer: COMMERCIAL

## 2025-03-10 VITALS — HEIGHT: 60 IN | WEIGHT: 97 LBS | BODY MASS INDEX: 19.04 KG/M2

## 2025-03-10 DIAGNOSIS — N80.9 ENDOMETRIOSIS: Primary | ICD-10-CM

## 2025-03-10 ASSESSMENT — PAIN SCALES - GENERAL: PAINLEVEL_OUTOF10: 3

## 2025-03-10 NOTE — PROGRESS NOTES
Virtual or Telephone Consent: An interactive audio and video telecommunication system which permits real time communications between the patient (at the originating site) and provider (at the distant site) was utilized to provide this telehealth service  MD reviewed, Authorization status not noted.    Follow Up Visit HPI    Patient is a 34 y.o.  female with History of Endometriosis presenting today for follow up visit s/p surgery with me 2025. Patient reports she is back to baseline but not yet had resumption of menses.  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 Carriers generally do not experience symptoms. 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 Carriers generally do not experience symptoms.   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    Operative Findings:  Hysteroscopy:  Uterine cavity: smooth contour  Bilateral ostia: visualized     Laparoscopy: Consistent with Stage I Endometriosis  The anterior cul-de-sac: normal  Posterior cul-de-sac: normal, powder burn lesions noted near retrocervical septum  Round ligaments: normal  The uterus: boggy, otherwise normal in appearance  The fallopian tubes: normal in appearance                - right fallopian tube with flow on chromopertubation                - left fallopian tube with proximal occlusion of dye, no obvious anatomic defects  The ovaries: normal right ovary; left ovary with small endometritoic lesion  Franklyn jeffersons window in right ovarian fossae  Liver: normal  Appendix: normal  Pathology  FINAL DIAGNOSIS   A.  Left ovarian lesion:  --Small fragment of fibroadipose tissue with crush artifact     Note:  Immunohistochemical study has been performed.  Pancytokeratin AE1/AE3 shows focal immunoreactivity.     GYN Pelvic Ultrasound: US PELVIS TRANSVAGINAL (12/18/2024): normal appearing uterus and ovaries bilaterally     Partner SA:  low morphology, otherwise normal  Semen Appearance Normal   Semen Viscosity Normal   Semen Liquefaction Normal   Debris (Semen) Yes   Clumps (Semen) Yes   Agglutination (Semen) No   Volume (Semen)  >=1.5 mL 2.9   Concentration(Semen)  >=15 mill/mL 60.61   Total Motility (Semen)  >=40 % 64   Prog. Motility (Semen)  >=32 % 58   Non Prog. Motility (Semen)  % 6   Total No of Sperm (Semen)  >=39 mill 175.76   Total No of Motile (Semen)  mill 111.61   % Normal (Semen)  >=4 % 0.8 Abnormal    % Head defects (Semen)  % 99.0   % Neck Midpiece (Semen)  % 38.0   % Tail defects (Semen)  % 9.0   % Ex Residual Cytoplasm (Semen)  % 0   Tot. No of Norm. Sperm (Semen)  mill 1.318   Tot. No of Norm. Motile Sperm (Semen)  mill 0.837   Abstinence (days)  2 - 7 days 3   Collected Completely Yes   Collection Location Center   Collection Method Masturbation     Treatment to date:   Clomid 50 mg/TIC x 2 cycles with + OPKs: 7-2024 & 8-2024  Letrozole 2.5 mg/TIC x 2 cycles- 9-2024 &     Past Medical History:   Diagnosis Date    Acute otitis externa 04/09/2024    Anxiety disorder, unspecified 10/30/2020    Anxiety and depression    Anxiety disorder, unspecified 06/15/2020    Anxiety and depression    Chronic vascular disorders of intestine 04/02/2021    Mesenteric artery syndrome (superior)    Fibromyalgia      Past Surgical History:   Procedure Laterality Date    HYSTEROSCOPY      OTHER SURGICAL HISTORY  10/24/2019    Dental surgery    OTHER SURGICAL HISTORY  07/13/2021    Ovarian cystectomy    OTHER SURGICAL HISTORY  07/13/2021    Laparotomy    OTHER SURGICAL HISTORY  06/15/2020    [place] tooth extraction     Current Outpatient Medications on File Prior to Visit   Medication Sig Dispense Refill     acetaminophen (Tylenol) 325 mg tablet Take 2 tablets (650 mg) by mouth every 6 hours if needed for mild pain (1 - 3) for up to 20 doses. 20 tablet 0    citalopram (CeleXA) 40 mg tablet Take 1 tablet (40 mg) by mouth once daily. 90 tablet 3    RABEprazole (Aciphex) EC tablet Take 2 tablets (40 mg) by mouth once daily. 180 tablet 3    rizatriptan (Maxalt) 10 mg tablet Take 1 tablet (10 mg) by mouth if needed for migraine. Take at onset of headache. May repeat after 2 hours. Max 2 tabs/day. 9 tablet 11    famotidine (Pepcid) 20 mg tablet Take 2 tablets (40 mg) by mouth 2 times a day. 360 tablet 3     No current facility-administered medications on file prior to visit.       BMI:   BMI Readings from Last 1 Encounters:   03/10/25 18.94 kg/m²     VITALS:  Ht 1.524 m (5')   Wt (!) 44 kg (97 lb)   LMP  (LMP Unknown)   BMI 18.94 kg/m²   LMP: No LMP recorded (lmp unknown).    ASSESSMENT   34 y.o.  female with secondary infertility x 1 year, History of Endometriosis and the following pertinent medical issues: BMI 19, AMH 7.131 .    COUNSELING  We discussed that Letrozole is used for ovulation induction and that recent studies have found letrozole is superior to Clomid for ovulation induction in patients with PCOS. We discussed common side effects of Letrozole including headaches, vision changes, hot flashes, mood changes, and breast tenderness.  Letrozole is NOT FDA approved for the treatment of infertility and was initially associated with (in some studies) a higher risk of congenital anomalies, although this was not found in a more recent large study of patients taking Letrozole for unexplained infertility. Patients must take a pregnancy test prior to starting Letrozole each month. We discussed that there is an increased risk of multiple gestation with Letrozole approximately 10% for twins and less than 1% for triplets.  Counseled regarding option of selective reduction for higher order multiples.    Routine  Testing  Fertility Center  STDs Within 1 year   Genetic carrier Waiver/Completed   T&S Within 1 year   AMH Within 1 year   TSH Within 1 year   Rubella/Varicella Within 5 years     BMI Testing  Fertility Center  CBC Within 1 year   CMP Within 1 year   HgbA1c Within 1 year   Mag, Phos, Vit D <18 Within 1 year   MFM > 40  REQ   Wt loss consult > 40 OPT     PLAN  No orders of the defined types were placed in this encounter.  May proceed with OI/TIC x3 ovulatory cycles    FOLLOW UP   Consults:  none  Engaged MD  Take prenatal vitamins, vitamin D 2000 IUs daily  Discussed that treatment cannot proceed until checklist items are complete.   Additional testing for BMI < 18 or > 40: NA.  Patient to schedule follow up visit in 3-6 months. Advised patient to arrange this now with the .  Chart to primary nurse for care coordination and patient check list/education.    MD Completion:  Ectopic Risk: No  Medically Complex: No  Outstanding boarding pass items: none    Fertility Plan Update: Letrozole 5 mg on CD3-7 with OPKs and TIC x 3 cycles, plan for CD21 P4 level with first cycle    Intimate Exam Performed: No, an intimate exam was not performed at this encounter.     Yoly Mc  03/10/2025  1:49 PM

## 2025-03-10 NOTE — Clinical Note
Patient for letrozole/TIC cycles (and CD21 P4 level with first cycle) if we could please complete BP, thanks!

## 2025-03-13 ENCOUNTER — APPOINTMENT (OUTPATIENT)
Dept: ENDOCRINOLOGY | Facility: CLINIC | Age: 35
End: 2025-03-13
Payer: COMMERCIAL

## 2025-03-18 ENCOUNTER — TELEPHONE (OUTPATIENT)
Dept: PRIMARY CARE | Facility: CLINIC | Age: 35
End: 2025-03-18
Payer: COMMERCIAL

## 2025-03-18 DIAGNOSIS — G43.009 MIGRAINE WITHOUT AURA AND WITHOUT STATUS MIGRAINOSUS, NOT INTRACTABLE: ICD-10-CM

## 2025-03-18 RX ORDER — RIZATRIPTAN BENZOATE 10 MG/1
10 TABLET ORAL AS NEEDED
Qty: 9 TABLET | Refills: 3 | Status: SHIPPED | OUTPATIENT
Start: 2025-03-18

## 2025-03-18 NOTE — TELEPHONE ENCOUNTER
Medication: rizatriptan     Dosage: 10 mg     Sig: Take 1 tablet (10 mg) by mouth if needed for migraine. Take at onset of headache. May repeat after 2 hours. Max 2 tabs/day.     Dispense Quantity:    Refills:     Pharmacy: [facility] [place]     LOV: 4/8/24    NOV: 6/18/25    Former [facility] patient

## 2025-04-07 ENCOUNTER — TELEPHONE (OUTPATIENT)
Dept: ENDOCRINOLOGY | Facility: CLINIC | Age: 35
End: 2025-04-07
Payer: COMMERCIAL

## 2025-04-07 NOTE — PROGRESS NOTES
Virtual or Telephone Consent: An interactive audio and video telecommunication system which permits real time communications between the patient (at the originating site) and provider (at the distant site) was utilized to provide this telehealth service  MD reviewed, Authorization status not noted.    Patient is a 34 yr old female  who presents for a Boarding Pass visit to start fertility treatment       Boarding Pass Oral TIC/IUI     Age: 34 y.o.    Provider: Yoly Mc MD  Primary RN: Ramu   Reasons for Treatment: History of Endometriosis  Last BMI  03/10/25 : 18.94 kg/m²         Medical History        Past Medical History:   Diagnosis Date    Acute otitis externa 2024    Anxiety disorder, unspecified 10/30/2020     Anxiety and depression    Anxiety disorder, unspecified 06/15/2020     Anxiety and depression    Chronic vascular disorders of intestine 2021     Mesenteric artery syndrome (superior)    Fibromyalgia              Date Done Consultation Results/Comments   03/10/2025 Medication Protocol     Letrozole 5 mg on CD3-7 with OPKs and TIC x 3 cycles, plan for CD21 P4 level with first cycle     Authorization to Share []   Patient to sign      Procedure Order Placed N/A     Date Done Female Labs Results/Comments   2025 T&S (Q 1 Year) ABO: A  Rh: POS  Antibody: NEG      2024 TSH 1.53   2024 AMH 7.131   2024 Hep B sAg Nonreactive   2024 Hep C AB Nonreactive   2024 HIV Nonreactive   2024 Syphilis Nonreactive   2024 GC/CT GC: Negative  CT: Negative   2024 Rubella (Q 5 Years) Positive   2024 Varicella (Q 5 Years) Positive (A)   2025 Surgery: Diagnostic hysteroscopy, Diagnostic laparoscopy, Bilateral chromopertubation, & Excision of endometriosis   - right fallopian tube with flow on chromopertubation   - left fallopian tube with proximal occlusion of dye, no obvious anatomic defects   2024 Pelvic Ultrasound Very small  echogenic area in endometrium ~2 mm, otherwise normal appearing uterus and ovaries bilaterally    1/6/2025 Carrier Screening Myriad 2bP: *[employer] FORESIGHT CARRIER SCREEN (1/6/2025):   Authorization completed  Pos carrier of biotinidase deficiency. Carriers generally do not experience symptoms. 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 Spouse negative.   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 Carriers generally do not experience symptoms. Spouse negative.     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 Spouse is negative.         Semen Analysis         MD Completion:  Ectopic Risk: yes: right fallopian tube with flow on chromopertubation &    left fallopian tube with proximal occlusion of dye, no obvious anatomic defects  Medically Complex: No    Intimate Exam Performed: No, an intimate exam was not performed at this encounter.     Boarding Pass reviewed with VIVIAN Rodriguez LPN and is complete. Patient & partner are cleared for fertility treatment: LMP 4-7-2025, CD 22 P4 Level on 4-: Letrozole 5 mg days 3-7/TIC x 3 cycles with CD21 P4 level on 1st cycle. Fela Melchor CNP 04/08/25 8:59 AM

## 2025-04-07 NOTE — TELEPHONE ENCOUNTER
Reason for call: reporting start of cycle  LMP:4/7/25  Treatment type: timed intercourse  Note:  Calling for Letrozole Rx

## 2025-04-07 NOTE — PROGRESS NOTES
Boarding Pass Oral TIC/IUI    Age: 34 y.o.    Provider: Yoly Mc MD  Primary RN: Ramu   Reasons for Treatment: History of Endometriosis  Last BMI  03/10/25 : 18.94 kg/m²       Past Medical History:   Diagnosis Date    Acute otitis externa 2024    Anxiety disorder, unspecified 10/30/2020    Anxiety and depression    Anxiety disorder, unspecified 06/15/2020    Anxiety and depression    Chronic vascular disorders of intestine 2021    Mesenteric artery syndrome (superior)    Fibromyalgia        Date Done Consultation Results/Comments   03/10/2025 Medication Protocol    Letrozole 5 mg on CD3-7 with OPKs and TIC x 3 cycles, plan for CD21 P4 level with first cycle    *** Authorization to Share []   Patient to sign     Procedure Order Placed N/A     Date Done Female Labs Results/Comments   2025 T&S (Q 1 Year) ABO: A  Rh: POS  Antibody: NEG     2024 Hep B sAg Nonreactive   2024 Hep C AB Nonreactive   2024 HIV Nonreactive   2024 Syphilis Nonreactive   2024 GC/CT GC: Negative  CT: Negative   2024 Rubella (Q 5 Years) Positive   2024 Varicella (Q 5 Years) Positive (A)   2025 Carrier Screening Myriad 2bP: *[employer] FORESIGHT CARRIER SCREEN (2025):   Authorization completed  Pos carrier of biotinidase deficiency. Carriers generally do not experience symptoms. 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 Carriers generally do not experience symptoms.    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

## 2025-04-07 NOTE — TELEPHONE ENCOUNTER
Telephone call sent to patient. Patient confirmed LMP as 4/7. Patient is getting set up for Letrozole 5 mg TIC. Outstanding checklist items: Engaged MD forms. Patient to fill out tonight. Patient scheduled for BPV with SUDHAKAR Melchor tomorrow at 0845 to begin treatment. Patient agreeable.       04/07/25 at 3:45 PM - Antoni Nagel RN

## 2025-04-08 ENCOUNTER — TELEMEDICINE (OUTPATIENT)
Dept: ENDOCRINOLOGY | Facility: CLINIC | Age: 35
End: 2025-04-08
Payer: COMMERCIAL

## 2025-04-08 VITALS — HEIGHT: 60 IN | BODY MASS INDEX: 18.65 KG/M2 | WEIGHT: 95 LBS

## 2025-04-08 DIAGNOSIS — N97.9 FEMALE INFERTILITY: Primary | ICD-10-CM

## 2025-04-08 RX ORDER — LETROZOLE 2.5 MG/1
5 TABLET, FILM COATED ORAL DAILY
Qty: 10 TABLET | Refills: 0 | Status: SHIPPED | OUTPATIENT
Start: 2025-04-08 | End: 2025-07-07

## 2025-04-08 ASSESSMENT — PATIENT HEALTH QUESTIONNAIRE - PHQ9
1. LITTLE INTEREST OR PLEASURE IN DOING THINGS: NOT AT ALL
SUM OF ALL RESPONSES TO PHQ9 QUESTIONS 1 AND 2: 0
2. FEELING DOWN, DEPRESSED OR HOPELESS: NOT AT ALL

## 2025-04-08 ASSESSMENT — COLUMBIA-SUICIDE SEVERITY RATING SCALE - C-SSRS
6. HAVE YOU EVER DONE ANYTHING, STARTED TO DO ANYTHING, OR PREPARED TO DO ANYTHING TO END YOUR LIFE?: NO
1. IN THE PAST MONTH, HAVE YOU WISHED YOU WERE DEAD OR WISHED YOU COULD GO TO SLEEP AND NOT WAKE UP?: NO
2. HAVE YOU ACTUALLY HAD ANY THOUGHTS OF KILLING YOURSELF?: NO

## 2025-04-08 ASSESSMENT — PAIN SCALES - GENERAL: PAINLEVEL_OUTOF10: 4

## 2025-04-28 ENCOUNTER — LAB REQUISITION (OUTPATIENT)
Dept: LAB | Facility: HOSPITAL | Age: 35
End: 2025-04-28

## 2025-04-28 DIAGNOSIS — N97.9 FEMALE INFERTILITY, UNSPECIFIED: ICD-10-CM

## 2025-04-28 LAB — PROGEST SERPL-MCNC: 10 NG/ML

## 2025-04-28 PROCEDURE — 84144 ASSAY OF PROGESTERONE: CPT

## 2025-04-28 NOTE — PROGRESS NOTES
Patient presents to outside  [facility] for progesterone level.    34yr old patient of Dr. Mc. LMP = 4/7/25, patient took Letrozole 5 mg CD 3-7 with plans for CD 21 P4 level with first cycle.    04/28/25 at 9:36 AM - Ya Anne RN    Patient added to noon huddle tomorrow. Lab not resulted as of 1640.    04/28/25 at 4:40 PM - Ya Anne RN    Component      Latest Ref Rng 4/28/2025   Progesterone      ng/mL 10.0      Monitoring patient, CD 21 P4 Level for Letrozole 5 mg/TIC cycle: P4 is ovulatory @ 10. Patient to call with menses or with + UPT. Plan communicated by myself. Plan agreed upon by Dr. Lim. Fela Melchor, CNP 04/28/25 9:54 PM

## 2025-04-29 DIAGNOSIS — F41.9 ANXIETY AND DEPRESSION: ICD-10-CM

## 2025-04-29 DIAGNOSIS — K21.9 GASTROESOPHAGEAL REFLUX DISEASE WITHOUT ESOPHAGITIS: ICD-10-CM

## 2025-04-29 DIAGNOSIS — F32.A ANXIETY AND DEPRESSION: ICD-10-CM

## 2025-04-29 NOTE — TELEPHONE ENCOUNTER
Rabeprazole EC tablet Take 2 tablets (40 mg) by mouth once daily     Citalopram  Take 1 tablet (40 mg) by mouth once daily.     [facility]     LOV: 4/8/24  NOV: 6/18/25

## 2025-04-30 RX ORDER — RABEPRAZOLE SODIUM 20 MG/1
40 TABLET, DELAYED RELEASE ORAL DAILY
Qty: 180 TABLET | Refills: 3 | Status: SHIPPED | OUTPATIENT
Start: 2025-04-30 | End: 2026-04-30

## 2025-04-30 RX ORDER — CITALOPRAM 40 MG/1
40 TABLET, FILM COATED ORAL DAILY
Qty: 90 TABLET | Refills: 3 | Status: SHIPPED | OUTPATIENT
Start: 2025-04-30 | End: 2026-04-30

## 2025-05-06 ENCOUNTER — TELEPHONE (OUTPATIENT)
Dept: ENDOCRINOLOGY | Facility: CLINIC | Age: 35
End: 2025-05-06
Payer: COMMERCIAL

## 2025-05-06 DIAGNOSIS — N97.9 FEMALE INFERTILITY: ICD-10-CM

## 2025-05-06 RX ORDER — LETROZOLE 2.5 MG/1
5 TABLET, FILM COATED ORAL DAILY
Qty: 10 TABLET | Refills: 0 | Status: SHIPPED | OUTPATIENT
Start: 2025-05-06 | End: 2025-08-04

## 2025-05-06 NOTE — TELEPHONE ENCOUNTER
Reason for call: reporting start of cycle  LMP:5/5/25  Treatment type: timed intercourse    Note:  Calling for Letrozole RX

## 2025-05-06 NOTE — TELEPHONE ENCOUNTER
Patient called with menses for TIC cycle  Cycle #: Letrozole TIC #2   Medication: Letrozole 5 mg   Ovulation: LH Kit  Sperm Source:  partner fresh  Approved donor sperm number: N/A  Luteal Support: N/A  Additional Medications: N/A    Boarding Pass signed off: Boarding Pass reviewed with VIVIAN Rodriguez LPN and is complete. Patient & partner are cleared for fertility treatment: LMP 4-7-2025, CD 22 P4 Level on 4-: Letrozole 5 mg days 3-7/TIC x 3 cycles with CD21 P4 level on 1st cycle. Fela Melchor CNP 04/08/25 8:59 AM      IUI order pended: N/A  Additional Information: attempted to reach patient regarding above plan. Detailed VM left for patient. Patient instructed Letrozole 5 mg was sent to local pharmacy and to take CD 3-7 after confirming negative UPT. Patient instructed to have intercourse CD10-20 every other day and if using OPK have intercourse night of +surge and following 2-3 nights. Patient instructed to then call with +UPT or start of next menses. Patient instructed to call office back with questions/concerns.   JOANNE OLSEN on 5/6/25 at 4:14 PM.

## 2025-06-05 ENCOUNTER — TELEPHONE (OUTPATIENT)
Dept: ENDOCRINOLOGY | Facility: CLINIC | Age: 35
End: 2025-06-05
Payer: COMMERCIAL

## 2025-06-05 DIAGNOSIS — N97.9 FEMALE INFERTILITY: ICD-10-CM

## 2025-06-05 RX ORDER — LETROZOLE 2.5 MG/1
5 TABLET, FILM COATED ORAL DAILY
Qty: 10 TABLET | Refills: 0 | Status: SHIPPED | OUTPATIENT
Start: 2025-06-05 | End: 2025-09-03

## 2025-06-05 NOTE — TELEPHONE ENCOUNTER
Patient called with menses for TIC cycle  Cycle #:  3  Medication: Letrozole  5mg  Ovulation: LH Kit  Sperm Source:  partner fresh  Approved donor sperm number:  Luteal Support: na  Additional Medications: na  Boarding Pass signed off: Fela Melchor CNP 04/08/25 8:59 AM    IUI order pended:   Additional Information:    LMP=6/04/2025    patient instructed Letrozole 5 mg was sent to local pharmacy and to take CD 3-7 after confirming negative UPT. Patient instructed to have intercourse CD10-20 every other day and if using OPK have intercourse night of +surge and following 2-3 nights. Patient instructed to then call with +UPT or start of next menses.     matilda gan 06/05/25 11:14 AM

## 2025-06-05 NOTE — TELEPHONE ENCOUNTER
Reason for call: reporting start of cycle  LMP: 6/4/25  Treatment type: timed intercourse  Note:

## 2025-06-06 ENCOUNTER — TELEPHONE (OUTPATIENT)
Dept: ENDOCRINOLOGY | Facility: CLINIC | Age: 35
End: 2025-06-06
Payer: COMMERCIAL

## 2025-06-18 ENCOUNTER — APPOINTMENT (OUTPATIENT)
Dept: PRIMARY CARE | Facility: CLINIC | Age: 35
End: 2025-06-18
Payer: COMMERCIAL

## 2025-07-13 DIAGNOSIS — G43.009 MIGRAINE WITHOUT AURA AND WITHOUT STATUS MIGRAINOSUS, NOT INTRACTABLE: ICD-10-CM

## 2025-07-14 RX ORDER — RIZATRIPTAN BENZOATE 10 MG/1
10 TABLET ORAL AS NEEDED
Qty: 9 TABLET | Refills: 3 | Status: SHIPPED | OUTPATIENT
Start: 2025-07-14

## 2025-07-14 NOTE — TELEPHONE ENCOUNTER
Patient was last seen April of last year are you okay to fill? Patient will not see you till September please advise

## 2025-08-18 ENCOUNTER — TELEMEDICINE (OUTPATIENT)
Dept: ENDOCRINOLOGY | Facility: CLINIC | Age: 35
End: 2025-08-18
Payer: COMMERCIAL

## 2025-08-18 VITALS — HEIGHT: 60 IN | WEIGHT: 100 LBS | BODY MASS INDEX: 19.63 KG/M2

## 2025-08-18 DIAGNOSIS — N80.9 ENDOMETRIOSIS: ICD-10-CM

## 2025-08-18 DIAGNOSIS — Z31.83 ENCOUNTER FOR ASSISTED REPRODUCTIVE FERTILITY CYCLE: Primary | ICD-10-CM

## 2025-08-18 DIAGNOSIS — Z31.69 PROCREATIVE MANAGEMENT COUNSELING: ICD-10-CM

## 2025-08-18 PROCEDURE — 3008F BODY MASS INDEX DOCD: CPT | Performed by: STUDENT IN AN ORGANIZED HEALTH CARE EDUCATION/TRAINING PROGRAM

## 2025-08-18 PROCEDURE — 99213 OFFICE O/P EST LOW 20 MIN: CPT | Performed by: STUDENT IN AN ORGANIZED HEALTH CARE EDUCATION/TRAINING PROGRAM

## 2025-08-18 ASSESSMENT — PAIN SCALES - GENERAL: PAINLEVEL_OUTOF10: 4

## 2025-09-09 ENCOUNTER — APPOINTMENT (OUTPATIENT)
Dept: PRIMARY CARE | Facility: CLINIC | Age: 35
End: 2025-09-09
Payer: COMMERCIAL

## (undated) DEVICE — TROCAR, OPTICAL BLADELESS 5MM X 100 W/ADVANCED FIXATION

## (undated) DEVICE — SUTURE, VICRYL PLUS, 4-0, 27 IN, PS-2

## (undated) DEVICE — SYRINGE, 50 CC, LUER LOCK

## (undated) DEVICE — SOLUTION, SCRUB EXIDINE, 4% CHG, 4 OZ

## (undated) DEVICE — HYSTEROSCOPE, AVETA CORAL, 4.6MM

## (undated) DEVICE — Device

## (undated) DEVICE — SOLIDIFIER, KWIK-SORB, 1200CC

## (undated) DEVICE — POSITIONING KIT, PAGAZZI, PINK PAD XL, W/ ARM AND HEAD REST

## (undated) DEVICE — TUBE SET, PNEUMOLAR HEATED, SMOKE EVACU, HIGH-FLOW

## (undated) DEVICE — PUMP, STRYKERFLOW 2 & HANDPIECE W/10FT. IRRIGATION TUBING

## (undated) DEVICE — TROCAR, OPTICAL, BLADELESS, KII FIOS, 5 X 100MM, THREADED

## (undated) DEVICE — GLOVE, SURGICAL, PROTEXIS PI BLUE W/NEUTHERA, 6.0, PF, LF

## (undated) DEVICE — ACCESSORY, FLUID MANAGEMENT, AVETA

## (undated) DEVICE — PAD, SANITARY, OBSTETRICAL, W/ADHSV STRIP,11 IN,LF

## (undated) DEVICE — TROCAR SYSTEM, BALLOON, KII GELPORT, 12 X 100MM

## (undated) DEVICE — CARE KIT, LAPAROSCOPIC, ADVANCED

## (undated) DEVICE — ACCESSORY, AVETA, WASTE MANAGEMENT WITH CAP

## (undated) DEVICE — SHEAR, W/UNIPOLAR CAUTERY, ENDOSHEAR, 5 MM

## (undated) DEVICE — TRAY, FOLEY, LUBRI-SIL, 16FR, COMPLETE CARE W/STATLOCK

## (undated) DEVICE — ADHESIVE, SKIN, DERMABOND ADVANCED, 15CM, PEN-STYLE

## (undated) DEVICE — TIP, RUMI LAVENDER 5.1MM X 6CM